# Patient Record
Sex: MALE | Race: ASIAN | NOT HISPANIC OR LATINO | Employment: FULL TIME | URBAN - METROPOLITAN AREA
[De-identification: names, ages, dates, MRNs, and addresses within clinical notes are randomized per-mention and may not be internally consistent; named-entity substitution may affect disease eponyms.]

---

## 2017-03-15 PROBLEM — R10.9 ABDOMINAL PAIN: Status: ACTIVE | Noted: 2017-03-15

## 2017-04-25 ENCOUNTER — HOSPITAL ENCOUNTER (EMERGENCY)
Facility: HOSPITAL | Age: 62
Discharge: HOME | End: 2017-04-25
Attending: EMERGENCY MEDICINE
Payer: MEDICAID

## 2017-04-25 VITALS
HEIGHT: 67 IN | HEART RATE: 98 BPM | OXYGEN SATURATION: 97 % | DIASTOLIC BLOOD PRESSURE: 82 MMHG | RESPIRATION RATE: 18 BRPM | BODY MASS INDEX: 31.39 KG/M2 | TEMPERATURE: 98 F | SYSTOLIC BLOOD PRESSURE: 131 MMHG | WEIGHT: 200 LBS

## 2017-04-25 DIAGNOSIS — G89.29 CHRONIC RIGHT-SIDED THORACIC BACK PAIN: Primary | ICD-10-CM

## 2017-04-25 DIAGNOSIS — M54.6 CHRONIC RIGHT-SIDED THORACIC BACK PAIN: Primary | ICD-10-CM

## 2017-04-25 PROCEDURE — 96372 THER/PROPH/DIAG INJ SC/IM: CPT

## 2017-04-25 PROCEDURE — 99284 EMERGENCY DEPT VISIT MOD MDM: CPT | Mod: 25

## 2017-04-25 PROCEDURE — 63600175 PHARM REV CODE 636 W HCPCS: Performed by: EMERGENCY MEDICINE

## 2017-04-25 RX ORDER — ORPHENADRINE CITRATE 30 MG/ML
30 INJECTION INTRAMUSCULAR; INTRAVENOUS
Status: COMPLETED | OUTPATIENT
Start: 2017-04-25 | End: 2017-04-25

## 2017-04-25 RX ORDER — KETOROLAC TROMETHAMINE 30 MG/ML
15 INJECTION, SOLUTION INTRAMUSCULAR; INTRAVENOUS
Status: COMPLETED | OUTPATIENT
Start: 2017-04-25 | End: 2017-04-25

## 2017-04-25 RX ORDER — CYCLOBENZAPRINE HCL 10 MG
10 TABLET ORAL 3 TIMES DAILY PRN
Qty: 21 TABLET | Refills: 0 | Status: SHIPPED | OUTPATIENT
Start: 2017-04-25 | End: 2017-05-05

## 2017-04-25 RX ORDER — IBUPROFEN 600 MG/1
600 TABLET ORAL EVERY 6 HOURS PRN
Qty: 20 TABLET | Refills: 0 | Status: SHIPPED | OUTPATIENT
Start: 2017-04-25 | End: 2017-05-29

## 2017-04-25 RX ADMIN — ORPHENADRINE CITRATE 30 MG: 30 INJECTION INTRAMUSCULAR; INTRAVENOUS at 08:04

## 2017-04-25 RX ADMIN — KETOROLAC TROMETHAMINE 15 MG: 30 INJECTION, SOLUTION INTRAMUSCULAR at 08:04

## 2017-04-25 NOTE — ED AVS SNAPSHOT
[facility]  [address]  [place] [address]               Carmella Galo   2017  6:41 AM   ED    Description:  Male : 1955   Department:  [facility]           Your Care was Coordinated By:     Provider Role From To    Calvin Marquez MD Attending Provider 17 0709 --      Reason for Visit     Back Pain           Diagnoses this Visit        Comments    Chronic right-sided thoracic back pain    -  Primary       ED Disposition     ED Disposition Condition Comment    Discharge             To Do List           Follow-up Information     Follow up with Praveen Raphael MD .    Specialty:  Neurosurgery    Why:  You have an appointment May 8th at 2pm. Dr. Raphael is a back doctor.     Contact information:    [address]  [address]  [place] [address]  [phone]         These Medications        Disp Refills Start End    cyclobenzaprine (FLEXERIL) 10 MG tablet 21 tablet 0 2017    Take 1 tablet (10 mg total) by mouth 3 (three) times daily as needed for Muscle spasms. - Oral    Pharmacy: [address] Ph #: [phone]       ibuprofen (ADVIL,MOTRIN) 600 MG tablet 20 tablet 0 2017     Take 1 tablet (600 mg total) by mouth every 6 (six) hours as needed for Pain. - Oral    Pharmacy: [address] Ph #: [phone]         [facility]     [facility] -  Assistance  Unless otherwise directed by your provider, please contact [facility], our nurse care line that is available for  assistance.     Registered nurses in the [facility] provide: appointment scheduling, clinical advisement, health education, and other advisory services.  Call: [phone] (toll free)               Medications           Message regarding Medications     Verify the changes and/or additions to  your medication regime listed below are the same as discussed with your clinician today.  If any of these changes or additions are incorrect, please notify your healthcare provider.        START taking these NEW medications        Refills    cyclobenzaprine (FLEXERIL) 10 MG tablet 0    Sig: Take 1 tablet (10 mg total) by mouth 3 (three) times daily as needed for Muscle spasms.    Class: Print    Route: Oral    ibuprofen (ADVIL,MOTRIN) 600 MG tablet 0    Sig: Take 1 tablet (600 mg total) by mouth every 6 (six) hours as needed for Pain.    Class: Print    Route: Oral      These medications were administered today        Dose Freq    ketorolac injection 15 mg 15 mg ED 1 Time    Sig: Inject 15 mg into the muscle ED 1 Time.    Class: Normal    Route: Intramuscular    orphenadrine injection 30 mg 30 mg ED 1 Time    Sig: Inject 1 mL (30 mg total) into the muscle ED 1 Time.    Class: Normal    Route: Intramuscular           Verify that the below list of medications is an accurate representation of the medications you are currently taking.  If none reported, the list may be blank. If incorrect, please contact your healthcare provider. Carry this list with you in case of emergency.           Current Medications     atorvastatin (LIPITOR) 20 MG tablet Take 20 mg by mouth nightly.    clarithromycin (BIAXIN) 500 MG tablet Take 500 mg by mouth 2 (two) times daily.    cyclobenzaprine (FLEXERIL) 10 MG tablet Take 1 tablet (10 mg total) by mouth 3 (three) times daily as needed for Muscle spasms.    desonide 0.05% (DESOWEN) 0.05 % Oint Apply topically 2 (two) times daily. Apply to face    dicyclomine (BENTYL) 20 mg tablet Take 20 mg by mouth every 6 (six) hours as needed.    hydrochlorothiazide (HYDRODIURIL) 25 MG tablet Take 25 mg by mouth once daily.    hydrocodone-acetaminophen 10-325mg (NORCO)  mg Tab Take 1 tablet by mouth every 6 (six) hours as needed (Breakthrough pain of bilateral flanks).    hydrocodone-acetaminophen  "5-325mg (NORCO) 5-325 mg per tablet Take 1 tablet by mouth every 4 (four) hours as needed for Pain.    ibuprofen (ADVIL,MOTRIN) 600 MG tablet Take 1 tablet (600 mg total) by mouth every 6 (six) hours as needed for Pain.    ibuprofen (ADVIL,MOTRIN) 600 MG tablet Take 1 tablet (600 mg total) by mouth every 6 (six) hours as needed for Pain.    ketoconazole (NIZORAL) 2 % cream Apply topically 2 (two) times daily. To face TWICE DAILY x 2 weeks, then prn need    ketoconazole (NIZORAL) 2 % shampoo Apply topically once daily. Wash from neck down, leave on five minutes before washing off    lisinopril-hydrochlorothiazide (PRINZIDE,ZESTORETIC) 10-12.5 mg per tablet Take 1 tablet by mouth once daily.    meloxicam (MOBIC) 15 MG tablet Take one tablet by mouth once a day Take with food for pain do not take with ibuprofen or alleve, may take tylenol    metformin (GLUCOPHAGE) 500 MG tablet Take 500 mg by mouth once daily.    omeprazole (PRILOSEC) 20 MG capsule Take 20 mg by mouth once daily.    OTEZLA 30 mg Tab Take 1 tablet by mouth 2 (two) times daily.    pantoprazole (PROTONIX) 40 MG tablet Take 1 tablet (40 mg total) by mouth once daily.    tizanidine (ZANAFLEX) 4 MG tablet Take 4 mg by mouth every 8 (eight) hours.           Clinical Reference Information           Your Vitals Were     BP Pulse Temp Resp Height Weight    155/89 (BP Location: Right arm, Patient Position: Sitting) 87 98 °F (36.7 °C) (Oral) 20 5' 7" (1.702 m) 90.7 kg (200 lb)    SpO2 BMI             97% 31.32 kg/m2         Allergies as of 4/25/2017     No Known Allergies      Immunizations Administered on Date of Encounter - 4/25/2017     None      ED Micro, Lab, POCT     None      ED Imaging Orders     None        Discharge Instructions           Back Pain (Acute or Chronic)    Back pain is one of the most common problems. The good news is that most people feel better in 1 to 2 weeks, and most of the rest in 1 to 2 months. Most people can remain active.  People " experience and describe pain differently; not everyone is the same.  · The pain can be sharp, stabbing, shooting, aching, cramping or burning.  · Movement, standing, bending, lifting, sitting, or walking may worsen pain.  · It can be localized to one spot or area, or it can be more generalized.  · It can spread or radiate upwards, to the front, or go down your arms or legs (sciatica).  · It can cause muscle spasm.  Most of the time, mechanical problems with the muscles or spine cause the pain. Mechanical problems are usually caused by an injury to the muscles or ligaments. While illness can cause back pain, it is usually not caused by a serious illness. Mechanical problems include:   · Physical activity such as sports, exercise, work, or normal activity  · Overexertion, lifting, pushing, pulling incorrectly or too aggressively  · Sudden twisting, bending, or stretching from an accident, or accidental movement  · Poor posture  · Stretching or moving wrong, without noticing pain at the time  · Poor coordination, lack of regular exercise (check with your doctor about this)  · Spinal disc disease or arthritis  · Stress  Pain can also be related to pregnancy, or illness like appendicitis, bladder or kidney infections, pelvic infections, and many other things.  Acute back pain usually gets better in 1 to 2 weeks. Back pain related to disk disease, arthritis in the spinal joints or spinal stenosis (narrowing of the spinal canal) can become chronic and last for months or years.  Unless you had a physical injury (for example, a car accident or fall) X-rays are usually not needed for the initial evaluation of back pain. If pain continues and does not respond to medical treatment, X-rays and other tests may be needed.  Home care  Try these home care recommendations:  · When in bed, try to find a position of comfort. A firm mattress is best. Try lying flat on your back with pillows under your knees. You can also try lying on  your side with your knees bent up towards your chest and a pillow between your knees.  · At first, do not try to stretch out the sore spots. If there is a strain, it is not like the good soreness you get after exercising without an injury. In this case, stretching may make it worse.  · Avoid prolong sitting, long car rides, or travel. This puts more stress on the lower back than standing or walking.  · During the first 24 to 72 hours after an acute injury or flare up of chronic back pain, apply an ice pack to the painful area for 20 minutes and then remove it for 20 minutes. Do this over a period of 60 to 90 minutes or several times a day. This will reduce swelling and pain. Wrap the ice pack in a thin towel or plastic to protect your skin.  · You can start with ice, then switch to heat. Heat (hot shower, hot bath, or heating pad) reduces pain and works well for muscle spasms. Heat can be applied to the painful area for 20 minutes then remove it for 20 minutes. Do this over a period of 60 to 90 minutes or several times a day. Do not sleep on a heating pad. It can lead to skin burns or tissue damage.  · You can alternate ice and heat therapy. Talk with your doctor about the best treatment for your back pain.  · Therapeutic massage can help relax the back muscles without stretching them.  · Be aware of safe lifting methods and do not lift anything without stretching first.  Medicines  Talk to your doctor before using medicine, especially if you have other medical problems or are taking other medicines.  · You may use over-the-counter medicine as directed on the bottle to control pain, unless another pain medicine was prescribed. If you have chronic conditions like diabetes, liver or kidney disease, stomach ulcers, or gastrointestinal bleeding, or are taking blood thinners, talk to your doctor before taking any medicine.  · Be careful if you are given a prescription medicines, narcotics, or medicine for muscle spasms.  They can cause drowsiness, affect your coordination, reflexes, and judgement. Do not drive or operate heavy machinery.  Follow-up care  Follow up with your healthcare provider, or as advised.   A radiologist will review any X-rays that were taken. Your provide will notify you of any new findings that may affect your care.  Call 911  Call emergency services if any of the following occur:  · Trouble breathing  · Confusion  · Very drowsy or trouble awakening  · Fainting or loss of consciousness  · Rapid or very slow heart rate  · Loss of bowel or bladder control  When to seek medical advice  Call your healthcare provider right away if any of these occur:   · Pain becomes worse or spreads to your legs  · Weakness or numbness in one or both legs  · Numbness in the groin or genital area  Date Last Reviewed: 7/1/2016  © 6363-1712 [employer]. [address] [place] [address]. All rights reserved. This information is not intended as a substitute for professional medical care. Always follow your healthcare professional's instructions.          Discharge References/Attachments     BACK PAIN (ACUTE OR CHRONIC) (ENGLISH)    SPINE AND DISK PROBLEMS, COMMON (ENGLISH)    BACK PAIN (ACUTE OR CHRONIC) (Syriac)      Your Scheduled Appointments     May 08, 2017  2:00 PM CDT   New Patient with Praveen Raphael MD   [place] - Neurosurgery ([facility])    [address] [place] [address], [address]  [facility] [address]   [phone]            Jul 20, 2017  9:40 AM CDT   Established Patient Visit with MD DAYNA Champagne - Dermatology ([facility])    [address] [place] [id number] [phone]              [facility] Sign-Up     Activating your [facility] account is as easy as 1-2-3!     1) Visit [email or web address], select Sign Up Now, enter this activation code and your date of birth, then select Next.  [email or web address]  Expires: 4/29/2017  2:28 PM      2) Create a username and  password to use when you visit [facility] in the future and select a security question in case you lose your password and select Next.    3) Enter your e-mail address and click Sign Up!    Additional Information  If you have questions, please e-mail [email or web address] or call [phone] to talk to our [facility] staff. Remember, [facility] is NOT to be used for urgent needs. For medical emergencies, dial 911.         Smoking Cessation     If you would like to quit smoking:   You may be eligible for free services if you are a [place] resident and started smoking cigarettes before September 1, 1988.  Call the Smoking Cessation Trust ([facility]) toll free at (792) 601-4260 or (654) 153-7593.   Call 2-800-QUIT-NOW if you do not meet the above criteria.   Contact us via email: [email or web address]   View our website for more information: [email or web address]         [facility] complies with applicable Federal civil rights laws and does not discriminate on the basis of race, color, national origin, age, disability, or sex.        Language Assistance Services     ATTENTION: Language assistance services are available, free of charge. Please call [phone].      ATENCIÓN: Si michelle herbert, tiene a katz disposición servicios gratuitos de asistencia lingüística. Llame al [phone].     CHÚ Ý: N?u b?n nói Ti?ng Vi?t, có các d?ch v? h? tr? ngôn ng? mi?n phí dành cho b?n. G?i s? [phone].

## 2017-04-25 NOTE — DISCHARGE INSTRUCTIONS
Back Pain (Acute or Chronic)    Back pain is one of the most common problems. The good news is that most people feel better in 1 to 2 weeks, and most of the rest in 1 to 2 months. Most people can remain active.  People experience and describe pain differently; not everyone is the same.  · The pain can be sharp, stabbing, shooting, aching, cramping or burning.  · Movement, standing, bending, lifting, sitting, or walking may worsen pain.  · It can be localized to one spot or area, or it can be more generalized.  · It can spread or radiate upwards, to the front, or go down your arms or legs (sciatica).  · It can cause muscle spasm.  Most of the time, mechanical problems with the muscles or spine cause the pain. Mechanical problems are usually caused by an injury to the muscles or ligaments. While illness can cause back pain, it is usually not caused by a serious illness. Mechanical problems include:   · Physical activity such as sports, exercise, work, or normal activity  · Overexertion, lifting, pushing, pulling incorrectly or too aggressively  · Sudden twisting, bending, or stretching from an accident, or accidental movement  · Poor posture  · Stretching or moving wrong, without noticing pain at the time  · Poor coordination, lack of regular exercise (check with your doctor about this)  · Spinal disc disease or arthritis  · Stress  Pain can also be related to pregnancy, or illness like appendicitis, bladder or kidney infections, pelvic infections, and many other things.  Acute back pain usually gets better in 1 to 2 weeks. Back pain related to disk disease, arthritis in the spinal joints or spinal stenosis (narrowing of the spinal canal) can become chronic and last for months or years.  Unless you had a physical injury (for example, a car accident or fall) X-rays are usually not needed for the initial evaluation of back pain. If pain continues and does not respond to medical treatment, X-rays and other tests may  be needed.  Home care  Try these home care recommendations:  · When in bed, try to find a position of comfort. A firm mattress is best. Try lying flat on your back with pillows under your knees. You can also try lying on your side with your knees bent up towards your chest and a pillow between your knees.  · At first, do not try to stretch out the sore spots. If there is a strain, it is not like the good soreness you get after exercising without an injury. In this case, stretching may make it worse.  · Avoid prolong sitting, long car rides, or travel. This puts more stress on the lower back than standing or walking.  · During the first 24 to 72 hours after an acute injury or flare up of chronic back pain, apply an ice pack to the painful area for 20 minutes and then remove it for 20 minutes. Do this over a period of 60 to 90 minutes or several times a day. This will reduce swelling and pain. Wrap the ice pack in a thin towel or plastic to protect your skin.  · You can start with ice, then switch to heat. Heat (hot shower, hot bath, or heating pad) reduces pain and works well for muscle spasms. Heat can be applied to the painful area for 20 minutes then remove it for 20 minutes. Do this over a period of 60 to 90 minutes or several times a day. Do not sleep on a heating pad. It can lead to skin burns or tissue damage.  · You can alternate ice and heat therapy. Talk with your doctor about the best treatment for your back pain.  · Therapeutic massage can help relax the back muscles without stretching them.  · Be aware of safe lifting methods and do not lift anything without stretching first.  Medicines  Talk to your doctor before using medicine, especially if you have other medical problems or are taking other medicines.  · You may use over-the-counter medicine as directed on the bottle to control pain, unless another pain medicine was prescribed. If you have chronic conditions like diabetes, liver or kidney disease,  stomach ulcers, or gastrointestinal bleeding, or are taking blood thinners, talk to your doctor before taking any medicine.  · Be careful if you are given a prescription medicines, narcotics, or medicine for muscle spasms. They can cause drowsiness, affect your coordination, reflexes, and judgement. Do not drive or operate heavy machinery.  Follow-up care  Follow up with your healthcare provider, or as advised.   A radiologist will review any X-rays that were taken. Your provide will notify you of any new findings that may affect your care.  Call 911  Call emergency services if any of the following occur:  · Trouble breathing  · Confusion  · Very drowsy or trouble awakening  · Fainting or loss of consciousness  · Rapid or very slow heart rate  · Loss of bowel or bladder control  When to seek medical advice  Call your healthcare provider right away if any of these occur:   · Pain becomes worse or spreads to your legs  · Weakness or numbness in one or both legs  · Numbness in the groin or genital area  Date Last Reviewed: 7/1/2016  © 0638-9168 [employer]. [address] [place] [address], [place] [address]. All rights reserved. This information is not intended as a substitute for professional medical care. Always follow your healthcare professional's instructions.

## 2017-04-25 NOTE — ED NOTES
Pt c/o pain to the middle of his back for the past 5 months, worse in the past few days. States he has been seen for this pain before, but continues to have pain. States pain makes it difficult for him to sleep at night.

## 2017-04-25 NOTE — ED PROVIDER NOTES
Encounter Date: 4/25/2017       History     Chief Complaint   Patient presents with    Back Pain     Patient presents to the ED with complaints of having back pain that has been presents x 5 months but states now being worse over the past x 2 days.      Review of patient's allergies indicates:  No Known Allergies  HPI Comments: Patient is a 61-year-old male with a past medical history of diabetes, hypertension, chronic pain, cholecystectomy within the last year presents to emergency room for evaluation of right-sided mid to lower back pain that radiates down to the right lower back.  Pain has been present for the past 5 months.  The patient as been seen by multiple ER providers for the pain and has been told that is likely musculoskeletal in nature.  He has had 2 CAT scans both of them not showing anything acute.  There are no signs of renal colic.  Patient states he has pain it's in the right back and it hurts worse when he lays down or rolls over.  He denies any chest pain shortness breath abdominal pain nausea vomiting or diarrhea.  He states his pain began after his cholecystectomy.  He's had lab work at prior visits for this.  He hasn't followed up with a back doctor, primary care, or his surgeon for his symptoms.    Past Medical History:   Diagnosis Date    Allergy     Chronic pain     Diabetes mellitus     Hypertension      Past Surgical History:   Procedure Laterality Date    CHOLECYSTECTOMY       History reviewed. No pertinent family history.  Social History   Substance Use Topics    Smoking status: Smoker, Current Status Unknown     Packs/day: 0.50     Types: Cigarettes     Last attempt to quit: 12/2016    Smokeless tobacco: None    Alcohol use No     Review of Systems   Constitutional: Negative for fever.   HENT: Negative for ear pain, rhinorrhea and sore throat.    Eyes: Negative for pain and visual disturbance.   Respiratory: Negative for apnea, cough and shortness of breath.    Cardiovascular:  Negative for chest pain.   Gastrointestinal: Negative for abdominal pain, diarrhea, nausea and vomiting.   Genitourinary: Negative for difficulty urinating, dysuria, flank pain, frequency, hematuria and urgency.   Musculoskeletal: Positive for back pain. Negative for arthralgias, myalgias, neck pain and neck stiffness.   Skin: Negative for rash.   Neurological: Negative for syncope, weakness, numbness and headaches.   All other systems reviewed and are negative.      Physical Exam   Initial Vitals   BP Pulse Resp Temp SpO2   04/25/17 0640 04/25/17 0640 04/25/17 0640 04/25/17 0640 04/25/17 0640   155/89 87 20 98 °F (36.7 °C) 97 %     Physical Exam    Nursing note and vitals reviewed.  Constitutional: He appears well-developed.   HENT:   Head: Normocephalic and atraumatic.   Mouth/Throat: Oropharynx is clear and moist.   Eyes: Conjunctivae and EOM are normal. Pupils are equal, round, and reactive to light.   Neck: Normal range of motion. Neck supple.   Cardiovascular: Normal rate, regular rhythm, normal heart sounds and intact distal pulses. Exam reveals no gallop and no friction rub.    No murmur heard.  Pulmonary/Chest: Breath sounds normal. He has no wheezes. He has no rhonchi. He has no rales.   Abdominal: Soft. Bowel sounds are normal. There is no tenderness. There is no rebound and no guarding.   Musculoskeletal: Normal range of motion. He exhibits tenderness (over the right sided lower thoracic upper lumbar region.  With reproducible muscle tenderness.  No muscle spasms.).   Neurological: He is alert and oriented to person, place, and time. He has normal strength. No sensory deficit.   Skin: Skin is warm and dry.   Psychiatric: He has a normal mood and affect.         ED Course   Procedures  Labs Reviewed - No data to display          Medical Decision Making:   The patient's pain is muscular skeletal nature and may have a pinched nerve with a mild radiculopathy.  The patient needs to see primary care or spine  surgeon.  He has no neurologic deficits, fevers, chest pain, abdominal pain.  I don't believe this is acute coronary syndrome, biliary obstruction, renal colic, pulmonary embolism, pneumonia, pneumothorax, infectious process such as epidural abscess.  The patient is likely stable for discharge with anti-inflammatories and muscle relaxers.  He was given a shot of Toradol and Norflex here.  We are attempting to schedule an appointment with Dr. Raphael.  Patient does have a language issue and history with recommendations were made using the kidneys .  This is very likely playing a role with him questioning is diagnosis and treatment plan.                   ED Course     Clinical Impression:   The encounter diagnosis was Chronic right-sided thoracic back pain.          Calvin Marquez MD  04/25/17 0857

## 2017-05-08 ENCOUNTER — OFFICE VISIT (OUTPATIENT)
Dept: NEUROSURGERY | Facility: CLINIC | Age: 62
End: 2017-05-08
Payer: MEDICAID

## 2017-05-08 VITALS
WEIGHT: 198.38 LBS | BODY MASS INDEX: 31.07 KG/M2 | HEART RATE: 115 BPM | SYSTOLIC BLOOD PRESSURE: 127 MMHG | DIASTOLIC BLOOD PRESSURE: 83 MMHG

## 2017-05-08 DIAGNOSIS — M54.6 CHRONIC RIGHT-SIDED THORACIC BACK PAIN: ICD-10-CM

## 2017-05-08 DIAGNOSIS — G89.29 CHRONIC RIGHT-SIDED THORACIC BACK PAIN: ICD-10-CM

## 2017-05-08 DIAGNOSIS — M54.32 LEFT SIDED SCIATICA: Primary | ICD-10-CM

## 2017-05-08 PROCEDURE — 99999 PR PBB SHADOW E&M-EST. PATIENT-LVL III: CPT | Mod: PBBFAC,,, | Performed by: PHYSICIAN ASSISTANT

## 2017-05-08 PROCEDURE — 99203 OFFICE O/P NEW LOW 30 MIN: CPT | Mod: S$PBB,,, | Performed by: PHYSICIAN ASSISTANT

## 2017-05-08 PROCEDURE — 99213 OFFICE O/P EST LOW 20 MIN: CPT | Mod: PBBFAC,PO | Performed by: PHYSICIAN ASSISTANT

## 2017-05-08 NOTE — PROGRESS NOTES
[facility]  Clinic Consult     Consult Requested By: ED physician     Reason for Consult: back pain       SUBJECTIVE:     Chief Complaint: right sided upper back pain, left leg pain     History of Present Illness:  Carmella Galo is a 61 y.o. male who presents with complaints of right sided upper back pain and left leg pain. This pain is chronic and the patient has been seen in the ED multiple times for these complaints. He was last seen on 4/25/17. There is a language barrier present, so history is limited. Patient will need an  for future visits.     Patient complains of right sided upper back pain. This pain is constant. It will occasionally radiating up and down his back. The patient also complains of left leg pain. This is constant and is in the posterior aspect of his leg. He denies low back pain. Reports occasional numbness in foot. The pain is worse with movement. He is unable to walk due to the pain. The pain keeps him up at night. Denies injury or trauma. Denies symptoms of bowel/bladder dysfunction.       Review of patient's allergies indicates:  No Known Allergies    Past Medical History:   Diagnosis Date    Allergy     Chronic pain     Diabetes mellitus     Hypertension      Past Surgical History:   Procedure Laterality Date    CHOLECYSTECTOMY       No family history on file.  Social History   Substance Use Topics    Smoking status: Smoker, Current Status Unknown     Packs/day: 0.50     Types: Cigarettes     Last attempt to quit: 12/2016    Smokeless tobacco: None    Alcohol use No        Review of Systems:  Constitutional: no fever, chills or night sweats. No changes in weight   Eyes: no visual changes   ENT: no nasal congestion or sore throat   Respiratory: no cough or shortness of breath   Cardiovascular: no chest pain or palpitations   Gastrointestinal: no nausea or vomiting   Genitourinary: no hematuria or dysuria   Integument/Breast: no rash or pruritis    Musculoskeletal: right upper back pain, left leg pain   Neurological: no seizures or tremors         OBJECTIVE:     Vital Signs (Most Recent):  Pulse: (!) 115 (05/08/17 1354)  BP: 127/83 (05/08/17 1354)    Physical Exam:  General: well developed, well nourished, no distress.   Neurologic: Alert and oriented. Thought content appropriate.  GCS: Motor: 6/Verbal: 5/Eyes: 4 GCS Total: 15  Head: normocephalic, atraumatic  Eyes: EOMI.  Neck: trachea midline, no JVD   Cardiovascular: no LE edema  Pulmonary: normal respirations, no signs of respiratory distress  Abdomen: soft, non-distended  Sensory: intact to light touch throughout  Skin: Skin is warm, dry and intact.  Motor Strength: Moves all extremities spontaneously with good tone.  No abnormal movements seen.     Strength  Deltoids Triceps Biceps Wrist Extension Wrist Flexion Hand  Interossei   Upper: R 5/5 5/5 5/5 5/5 5/5 5/5 5/5    L 5/5 5/5 5/5 5/5 5/5 5/5 5/5     Iliopsoas Quadriceps Knee  Flexion Tibialis  anterior Gastro- cnemius EHL    Lower: R 5/5 5/5 5/5 5/5 5/5 5/5     L 5/5 5/5 5/5 5/5 5/5 5/5      DTR's: 2 + and symmetric in UE and LE  Clonus: absent    Gait: normal          Able to walk on heels & toes  Back: full ROM, no pain with ROM, no midline tenderness, point tenderness right paraspinal, mid thoracic region   Straight leg raise: negative bilaterally   SI Joint tenderness: Negative bilaterally       Diagnostic Results:  No dedicated spine imaging   I have independently reviewed CT Abd/pelvis: reveals stenosis at L4-5     ASSESSMENT/PLAN:     60 yo male with right sided upper back pain likely muscular in nature and posterior left leg pain which could be caused by L4-5 stenosis seen on CT.     -MRI lumbar spine  -Follow up with Dr. Raphael after imaging. Patient will need a  at this visit.     Discussed with Dr. Donavon Badillo PA-C  Neurosurgery

## 2017-05-12 ENCOUNTER — HOSPITAL ENCOUNTER (OUTPATIENT)
Dept: RADIOLOGY | Facility: HOSPITAL | Age: 62
Discharge: HOME | End: 2017-05-12
Attending: NEUROLOGICAL SURGERY
Payer: MEDICAID

## 2017-05-12 DIAGNOSIS — M54.32 LEFT SIDED SCIATICA: ICD-10-CM

## 2017-05-12 PROCEDURE — 72148 MRI LUMBAR SPINE W/O DYE: CPT | Mod: TC,PO

## 2017-05-29 ENCOUNTER — OFFICE VISIT (OUTPATIENT)
Dept: NEUROSURGERY | Facility: CLINIC | Age: 62
End: 2017-05-29
Payer: MEDICAID

## 2017-05-29 DIAGNOSIS — M48.061 SPINAL STENOSIS OF LUMBAR REGION WITH RADICULOPATHY: ICD-10-CM

## 2017-05-29 DIAGNOSIS — G89.29 CHRONIC MIDLINE THORACIC BACK PAIN: Primary | ICD-10-CM

## 2017-05-29 DIAGNOSIS — M54.16 SPINAL STENOSIS OF LUMBAR REGION WITH RADICULOPATHY: ICD-10-CM

## 2017-05-29 DIAGNOSIS — M54.6 CHRONIC MIDLINE THORACIC BACK PAIN: Primary | ICD-10-CM

## 2017-05-29 PROCEDURE — 99213 OFFICE O/P EST LOW 20 MIN: CPT | Mod: PBBFAC,PO | Performed by: NEUROLOGICAL SURGERY

## 2017-05-29 PROCEDURE — 99999 PR PBB SHADOW E&M-EST. PATIENT-LVL III: CPT | Mod: PBBFAC,,, | Performed by: NEUROLOGICAL SURGERY

## 2017-05-29 PROCEDURE — 99214 OFFICE O/P EST MOD 30 MIN: CPT | Mod: S$PBB,,, | Performed by: NEUROLOGICAL SURGERY

## 2017-05-29 RX ORDER — CELECOXIB 200 MG/1
200 CAPSULE ORAL 2 TIMES DAILY
Qty: 28 CAPSULE | Refills: 1 | Status: SHIPPED | OUTPATIENT
Start: 2017-05-29 | End: 2017-08-07 | Stop reason: SDUPTHER

## 2017-05-29 NOTE — PROGRESS NOTES
NEUROSURGICAL OUTPATIENT CONSULTATION NOTE    DATE OF SERVICE:  05/29/2017    ATTENDING PHYSICIAN:  Praveen Raphael MD    CONSULT REQUESTED BY:  Self referred    REASON FOR CONSULT:  Thoracic back pain, left leg pain    SUBJECTIVE:    HISTORY OF PRESENT ILLNESS:  The interview was done with a  because the patient is speaking Lao. This is a very pleasant 61 y.o. male who is complaining of worsening thoracic back pain and left leg pain for many years. The thoracic back pain is constant and is severe. The patient has been out of work because of the back pain . He has more back pain than leg pain. The leg pain is triggered by walking and irradiate in the L5 and S1 distribution on the left side. He cannot walk long distances because of the back and leg pain. He tried injection in the past and PT with some pain relief. In the last 5 months his pain has become worse. He would like to have pain relief in order to get back to work. Denies having weakness or sphincter dysfunction.     Low Back Pain Scale  R Low Back-Pain Score: 7  R Low Back-Pain Intensity: Pain killers give complete relief from pain  R Low Back-Pain Score: It is painful to look after myself and I am slow and careful  Low Back-Lifting: Pain prevents me from lifting heavy weights off the floor, but I can manage if they are conveniently positioned for example on a table   Low Back-Walking: Pain prevents me walking more than .5 mile   Low Back-Sitting: Pain prevents me from sitting more than 1 hour   Low Back-Standing: I cannot stand for longer than 1 hour without increasing pain   Low Back-Sleeping: Because of pain my normal nights sleep is reduced by less than one quarter   Low Back-Social Life: Pain has no significant effect on my social life apart from limiting my more en   Low Back-Traveling: I have extra pain while traveling but it does not compel me to seek alternate forms of travel   Low Back-Changing Degree of Pain: My pain is gradually  worsening         PAST MEDICAL HISTORY:  Active Ambulatory Problems     Diagnosis Date Noted    Abdominal pain 03/15/2017     Resolved Ambulatory Problems     Diagnosis Date Noted    No Resolved Ambulatory Problems     Past Medical History:   Diagnosis Date    Allergy     Chronic pain     Diabetes mellitus     Hypertension        PAST SURGICAL HISTORY:  Past Surgical History:   Procedure Laterality Date    CHOLECYSTECTOMY         SOCIAL HISTORY:   Social History     Social History    Marital status:      Spouse name: N/A    Number of children: N/A    Years of education: 12     Occupational History    Not on file.     Social History Main Topics    Smoking status: Smoker, Current Status Unknown     Packs/day: 0.50     Types: Cigarettes     Last attempt to quit: 12/2016    Smokeless tobacco: Not on file    Alcohol use No    Drug use: No    Sexual activity: No     Other Topics Concern    Not on file     Social History Narrative    No narrative on file       FAMILY HISTORY:  No family history on file.    CURRENTS MEDICATIONS:  Current Outpatient Prescriptions on File Prior to Visit   Medication Sig Dispense Refill    atorvastatin (LIPITOR) 20 MG tablet Take 20 mg by mouth nightly.  6    desonide 0.05% (DESOWEN) 0.05 % Oint Apply topically 2 (two) times daily. Apply to face 60 g 2    hydrochlorothiazide (HYDRODIURIL) 25 MG tablet Take 25 mg by mouth once daily.  1    ketoconazole (NIZORAL) 2 % shampoo Apply topically once daily. Wash from neck down, leave on five minutes before washing off 120 mL 2    metformin (GLUCOPHAGE) 500 MG tablet Take 500 mg by mouth once daily.  6    omeprazole (PRILOSEC) 20 MG capsule Take 20 mg by mouth once daily.  4    pantoprazole (PROTONIX) 40 MG tablet Take 1 tablet (40 mg total) by mouth once daily. 90 tablet 3    [DISCONTINUED] OTEZLA 30 mg Tab Take 1 tablet by mouth 2 (two) times daily.  10    clarithromycin (BIAXIN) 500 MG tablet Take 500 mg by mouth 2  (two) times daily.  0    dicyclomine (BENTYL) 20 mg tablet Take 20 mg by mouth every 6 (six) hours as needed.  0    hydrocodone-acetaminophen 5-325mg (NORCO) 5-325 mg per tablet Take 1 tablet by mouth every 4 (four) hours as needed for Pain. 18 tablet 0    lisinopril-hydrochlorothiazide (PRINZIDE,ZESTORETIC) 10-12.5 mg per tablet Take 1 tablet by mouth once daily.  6    [DISCONTINUED] hydrocodone-acetaminophen 10-325mg (NORCO)  mg Tab Take 1 tablet by mouth every 6 (six) hours as needed (Breakthrough pain of bilateral flanks). 12 tablet 0    [DISCONTINUED] ibuprofen (ADVIL,MOTRIN) 600 MG tablet Take 1 tablet (600 mg total) by mouth every 6 (six) hours as needed for Pain. 20 tablet 0    [DISCONTINUED] ibuprofen (ADVIL,MOTRIN) 600 MG tablet Take 1 tablet (600 mg total) by mouth every 6 (six) hours as needed for Pain. 20 tablet 0    [DISCONTINUED] ketoconazole (NIZORAL) 2 % cream Apply topically 2 (two) times daily. To face TWICE DAILY x 2 weeks, then prn need 60 g 2    [DISCONTINUED] meloxicam (MOBIC) 15 MG tablet Take one tablet by mouth once a day Take with food for pain do not take with ibuprofen or alleve, may take tylenol  5    [DISCONTINUED] tizanidine (ZANAFLEX) 4 MG tablet Take 4 mg by mouth every 8 (eight) hours.       No current facility-administered medications on file prior to visit.        ALLERGIES:  Review of patient's allergies indicates:  No Known Allergies    REVIEW OF SYSTEMS:  Review of Systems   Constitutional: Negative for diaphoresis, fever and weight loss.   Respiratory: Negative for shortness of breath.    Cardiovascular: Negative for chest pain.   Gastrointestinal: Negative for blood in stool.   Genitourinary: Negative for hematuria.   Endo/Heme/Allergies: Does not bruise/bleed easily.   All other systems reviewed and are negative.      OBJECTIVE:    PHYSICAL EXAMINATION:         Physical Exam:  Vitals reviewed.    Constitutional: He appears well-developed and well-nourished.      Eyes: Pupils are equal, round, and reactive to light. Conjunctivae and EOM are normal.     Cardiovascular: Normal distal pulses and no edema.     Abdominal: Soft.     Skin: Skin displays no rash on trunk and no rash on extremities. Skin displays no lesions on trunk and no lesions on extremities.     Psych/Behavior: He is alert. He is oriented to person, place, and time. He has a normal mood and affect.     Musculoskeletal:        Neck: Range of motion is full.     Neurological:        DTRs: Tricep reflexes are 2+ on the right side and 2+ on the left side. Bicep reflexes are 2+ on the right side and 2+ on the left side. Brachioradialis reflexes are 2+ on the right side and 2+ on the left side. Patellar reflexes are 2+ on the right side and 2+ on the left side. Achilles reflexes are 2+ on the right side and 2+ on the left side.       Back Exam     Tenderness   The patient is experiencing tenderness in the thoracic (thoracolumbar para-axial muscle pain).    Range of Motion   Extension: normal   Flexion: normal   Lateral Bend Right: normal   Lateral Bend Left: normal   Rotation Right: normal   Rotation Left: normal     Muscle Strength   Right Quadriceps:  5/5   Left Quadriceps:  5/5   Right Hamstrings:  5/5   Left Hamstrings:  5/5     Tests   Straight leg raise right: negative  Straight leg raise left: negative    Other   Toe Walk: normal  Heel Walk: normal            SI joint:   Palpation at the right and left SI joints not painful  JULIO test is negative bilaterally  Gaenslen test is negative bilaterally  Thigh thrust test is negative bilaterally    Neurologic Exam     Mental Status   Oriented to person, place, and time.   Speech: speech is normal   Level of consciousness: alert    Cranial Nerves   Cranial nerves II through XII intact.     CN III, IV, VI   Pupils are equal, round, and reactive to light.  Extraocular motions are normal.     Motor Exam   Muscle bulk: normal  Overall muscle tone: normal    Strength    Right deltoid: 5/5  Left deltoid: 5/5  Right biceps: 5/5  Left biceps: 5/5  Right triceps: 5/5  Left triceps: 5/5  Right wrist flexion: 5/5  Left wrist flexion: 5/5  Right wrist extension: 5/5  Left wrist extension: 5/5  Right interossei: 5/5  Left interossei: 5/5  Right iliopsoas: 5/5  Left iliopsoas: 5/5  Right quadriceps: 5/5  Left quadriceps: 5/5  Right hamstrin/5  Left hamstrin/5  Right anterior tibial: 5/5  Left anterior tibial: 5/5  Right posterior tibial: 5/5  Left posterior tibial: 5/5  Right peroneal: 5/5  Left peroneal: 5/5  Right gastroc: 5/5  Left gastroc: 5/5    Sensory Exam   Light touch normal.   Pinprick normal.     Gait, Coordination, and Reflexes     Gait  Gait: normal    Coordination   Finger to nose coordination: normal  Tandem walking coordination: normal    Reflexes   Right brachioradialis: 2+  Left brachioradialis: 2+  Right biceps: 2+  Left biceps: 2+  Right triceps: 2+  Left triceps: 2+  Right patellar: 2+  Left patellar: 2+  Right achilles: 2+  Left achilles: 2+  Right plantar: normal  Left plantar: normal  Right Stewart: absent  Left Stewart: absent  Right ankle clonus: absent  Left ankle clonus: absent        DIAGNOSTIC DATA:  I personally reviewed the following imaging:   Lumbar spine MRI 2017: Severe L4-5 stenosis    ASSESMENT:  This is a 61 y.o. male with     Problem List Items Addressed This Visit     None      Visit Diagnoses     Chronic midline thoracic back pain    -  Primary    Relevant Orders    MRI Thoracic Spine Without Contrast    Spinal stenosis of lumbar region with radiculopathy            R/O thoracic disc herniation, mass or fracture.    PLAN:  Thoracic spine MRI  Celebrex 200 mg PO BID  Continue PT at home  Follow-up after the thoracic spine MRI    The patient has increased perioperative risks because of these comorbidities: smoker.         Praveen Raphael MD  Pager: [phone]

## 2017-06-02 ENCOUNTER — HOSPITAL ENCOUNTER (OUTPATIENT)
Dept: RADIOLOGY | Facility: HOSPITAL | Age: 62
Discharge: HOME | End: 2017-06-02
Attending: NEUROLOGICAL SURGERY
Payer: MEDICAID

## 2017-06-02 DIAGNOSIS — M54.6 CHRONIC MIDLINE THORACIC BACK PAIN: ICD-10-CM

## 2017-06-02 DIAGNOSIS — G89.29 CHRONIC MIDLINE THORACIC BACK PAIN: ICD-10-CM

## 2017-06-02 PROCEDURE — 72146 MRI CHEST SPINE W/O DYE: CPT | Mod: 26,,, | Performed by: RADIOLOGY

## 2017-06-02 PROCEDURE — 72146 MRI CHEST SPINE W/O DYE: CPT | Mod: TC

## 2017-06-03 DIAGNOSIS — M47.814 FACET ARTHROPATHY, THORACIC: Primary | ICD-10-CM

## 2017-06-05 ENCOUNTER — TELEPHONE (OUTPATIENT)
Dept: NEUROSURGERY | Facility: CLINIC | Age: 62
End: 2017-06-05

## 2017-07-11 ENCOUNTER — TELEPHONE (OUTPATIENT)
Dept: NEUROSURGERY | Facility: CLINIC | Age: 62
End: 2017-07-11

## 2017-07-11 NOTE — TELEPHONE ENCOUNTER
Called Pt daughter in regards to a follow up appointment for her dad for the MRI scheduled pt to see Dr Raphael 08/07/17

## 2017-08-07 ENCOUNTER — HOSPITAL ENCOUNTER (OUTPATIENT)
Dept: RADIOLOGY | Facility: HOSPITAL | Age: 62
Discharge: HOME | End: 2017-08-07
Attending: NEUROLOGICAL SURGERY
Payer: MEDICAID

## 2017-08-07 ENCOUNTER — OFFICE VISIT (OUTPATIENT)
Dept: NEUROSURGERY | Facility: CLINIC | Age: 62
End: 2017-08-07
Payer: MEDICAID

## 2017-08-07 VITALS
SYSTOLIC BLOOD PRESSURE: 132 MMHG | HEIGHT: 67 IN | DIASTOLIC BLOOD PRESSURE: 86 MMHG | WEIGHT: 205 LBS | BODY MASS INDEX: 32.18 KG/M2 | HEART RATE: 117 BPM

## 2017-08-07 DIAGNOSIS — G95.9 CERVICAL MYELOPATHY: Primary | ICD-10-CM

## 2017-08-07 DIAGNOSIS — G95.9 CERVICAL MYELOPATHY: ICD-10-CM

## 2017-08-07 DIAGNOSIS — M48.062 SPINAL STENOSIS OF LUMBAR REGION WITH NEUROGENIC CLAUDICATION: ICD-10-CM

## 2017-08-07 PROCEDURE — 72100 X-RAY EXAM L-S SPINE 2/3 VWS: CPT | Mod: 26,,, | Performed by: RADIOLOGY

## 2017-08-07 PROCEDURE — 99999 PR PBB SHADOW E&M-EST. PATIENT-LVL III: CPT | Mod: PBBFAC,,, | Performed by: NEUROLOGICAL SURGERY

## 2017-08-07 PROCEDURE — 99213 OFFICE O/P EST LOW 20 MIN: CPT | Mod: S$PBB,,, | Performed by: NEUROLOGICAL SURGERY

## 2017-08-07 PROCEDURE — 72100 X-RAY EXAM L-S SPINE 2/3 VWS: CPT | Mod: TC

## 2017-08-07 PROCEDURE — 72040 X-RAY EXAM NECK SPINE 2-3 VW: CPT | Mod: TC

## 2017-08-07 PROCEDURE — 72040 X-RAY EXAM NECK SPINE 2-3 VW: CPT | Mod: 26,,, | Performed by: RADIOLOGY

## 2017-08-07 PROCEDURE — 3008F BODY MASS INDEX DOCD: CPT | Mod: ,,, | Performed by: NEUROLOGICAL SURGERY

## 2017-08-07 PROCEDURE — 99213 OFFICE O/P EST LOW 20 MIN: CPT | Mod: PBBFAC,PO | Performed by: NEUROLOGICAL SURGERY

## 2017-08-07 RX ORDER — CELECOXIB 200 MG/1
200 CAPSULE ORAL 2 TIMES DAILY
Qty: 28 CAPSULE | Refills: 3 | Status: SHIPPED | OUTPATIENT
Start: 2017-08-07 | End: 2017-08-31

## 2017-08-07 NOTE — PROGRESS NOTES
NEUROSURGICAL PROGRESS NOTE    DATE OF SERVICE:  08/07/2017    ATTENDING PHYSICIAN:  Praveen Raphael MD    SUBJECTIVE:    INTERIM HISTORY:    The interview was done with a  because the patient is speaking Greek. This is a very pleasant 61 y.o. male who is complaining of worsening thoracic back pain and left leg pain for many years. The thoracic back pain is constant and is severe. The patient has been out of work because of the back pain . He has more back pain than leg pain. The leg pain is triggered by walking and irradiate in the L5 and S1 distribution on the left side. He cannot walk long distances because of the back and leg pain. He tried injection in the past and PT with some pain relief. In the last 5 months his pain has become worse. He would like to have pain relief in order to get back to work. Denies having weakness or sphincter dysfunction.     Since his last visit the patient was treated with celebrex and his thoracolumbar back pain has improved. He still has numbness in both legs and difficulty walking. He also complains of bilateral hands numbness at night and during the day. He has gait imbalance. Denies having difficulty buttoning shirts or dropping things.          Low Back Pain Scale  R Low Back-Pain Score: 2  R Low Back-Pain Intensity: The pain is bad, but I manage without taking pain killers  R Low Back-Pain Score: I can look after myself normally but it causes extra pain  Low Back-Lifting: I can lift heavy weights but it gives extra pain   Low Back-Walking: Pain prevents me walking more than 1 mile   Low Back-Sitting: I can sit only in my favorite chair as long as I like   Low Back-Standing: I have some pain on standing but it does not increase with time   Low Back-Sleeping: I have pain in bed but it does not prevent me from sleeping well   Low Back-Social Life: My social life is normal but it increases the degree of pain   Low Back-Traveling: I have some pain when traveling but  shamar of my usual forms of travel make it any worse   Low Back-Changing Degree of Pain: My pain fluctuates but is definitely getting better         PAST MEDICAL HISTORY:  Active Ambulatory Problems     Diagnosis Date Noted    Abdominal pain 03/15/2017     Resolved Ambulatory Problems     Diagnosis Date Noted    No Resolved Ambulatory Problems     Past Medical History:   Diagnosis Date    Allergy     Chronic pain     Diabetes mellitus     Hypertension        PAST SURGICAL HISTORY:  Past Surgical History:   Procedure Laterality Date    CHOLECYSTECTOMY         SOCIAL HISTORY:   Social History     Social History    Marital status:      Spouse name: N/A    Number of children: N/A    Years of education: 12     Occupational History    Not on file.     Social History Main Topics    Smoking status: Smoker, Current Status Unknown     Packs/day: 0.50     Types: Cigarettes     Last attempt to quit: 12/2016    Smokeless tobacco: Not on file    Alcohol use No    Drug use: No    Sexual activity: No     Other Topics Concern    Not on file     Social History Narrative    No narrative on file       FAMILY HISTORY:  No family history on file.    CURRENTS MEDICATIONS:  Current Outpatient Prescriptions on File Prior to Visit   Medication Sig Dispense Refill    atorvastatin (LIPITOR) 20 MG tablet Take 20 mg by mouth nightly.  6    celecoxib (CELEBREX) 200 MG capsule Take 1 capsule (200 mg total) by mouth 2 (two) times daily. 28 capsule 1    desonide 0.05% (DESOWEN) 0.05 % Oint Apply topically 2 (two) times daily. Apply to face 60 g 2    dicyclomine (BENTYL) 20 mg tablet Take 20 mg by mouth every 6 (six) hours as needed.  0    hydrochlorothiazide (HYDRODIURIL) 25 MG tablet Take 25 mg by mouth once daily.  1    ketoconazole (NIZORAL) 2 % shampoo Apply topically once daily. Wash from neck down, leave on five minutes before washing off 120 mL 2    lisinopril-hydrochlorothiazide (PRINZIDE,ZESTORETIC) 10-12.5 mg  per tablet Take 1 tablet by mouth once daily.  6    metformin (GLUCOPHAGE) 500 MG tablet Take 500 mg by mouth once daily.  6    omeprazole (PRILOSEC) 20 MG capsule Take 20 mg by mouth once daily.  4    pantoprazole (PROTONIX) 40 MG tablet Take 1 tablet (40 mg total) by mouth once daily. 90 tablet 3    clarithromycin (BIAXIN) 500 MG tablet Take 500 mg by mouth 2 (two) times daily.  0    hydrocodone-acetaminophen 5-325mg (NORCO) 5-325 mg per tablet Take 1 tablet by mouth every 4 (four) hours as needed for Pain. 18 tablet 0     No current facility-administered medications on file prior to visit.        ALLERGIES:  Review of patient's allergies indicates:  No Known Allergies    REVIEW OF SYSTEMS:  Review of Systems   Constitutional: Negative for diaphoresis, fever and weight loss.   Respiratory: Negative for shortness of breath.    Cardiovascular: Negative for chest pain.   Gastrointestinal: Negative for blood in stool.   Genitourinary: Negative for hematuria.   Endo/Heme/Allergies: Does not bruise/bleed easily.   All other systems reviewed and are negative.        OBJECTIVE:    PHYSICAL EXAMINATION:   Vitals:    08/07/17 0953   BP: 132/86   Pulse: (!) 117       Physical Exam:  Vitals reviewed.    Constitutional: He appears well-developed and well-nourished.     Eyes: Pupils are equal, round, and reactive to light. Conjunctivae and EOM are normal.     Cardiovascular: Normal distal pulses and no edema.     Abdominal: Soft.     Skin: Skin displays no rash on trunk and no rash on extremities. Skin displays no lesions on trunk and no lesions on extremities.     Psych/Behavior: He is alert. He is oriented to person, place, and time. He has a normal mood and affect.     Musculoskeletal:        Neck: Range of motion is full.     Neurological:        DTRs: Tricep reflexes are 2+ on the right side and 2+ on the left side. Bicep reflexes are 2+ on the right side and 2+ on the left side. Brachioradialis reflexes are 2+ on the  right side and 2+ on the left side. Patellar reflexes are 3+ on the right side and 2+ on the left side. Achilles reflexes are 3+ on the right side and 2+ on the left side.       Back Exam     Tenderness   The patient is experiencing no tenderness.     Range of Motion   Extension: normal   Flexion: normal   Lateral Bend Right: normal   Lateral Bend Left: normal   Rotation Right: normal   Rotation Left: normal     Muscle Strength   Right Quadriceps:  5/5   Left Quadriceps:  5/5   Right Hamstrings:  5/5   Left Hamstrings:  5/5     Tests   Straight leg raise right: negative  Straight leg raise left: negative    Other   Toe Walk: normal  Heel Walk: normal              Neurologic Exam     Mental Status   Oriented to person, place, and time.   Speech: speech is normal   Level of consciousness: alert    Cranial Nerves   Cranial nerves II through XII intact.     CN III, IV, VI   Pupils are equal, round, and reactive to light.  Extraocular motions are normal.     Motor Exam   Muscle bulk: normal  Overall muscle tone: normal    Strength   Right deltoid: 5/5  Left deltoid: 5/5  Right biceps: 5/5  Left biceps: 5/5  Right triceps: 5/5  Left triceps: 5/5  Right wrist flexion: 5/5  Left wrist flexion: 5/5  Right wrist extension: 5/5  Left wrist extension: 5/5  Right interossei: 5/5  Left interossei: 4/5  Right iliopsoas: 5/5  Left iliopsoas: 5/5  Right quadriceps: 5/5  Left quadriceps: 5/5  Right hamstrin/5  Left hamstrin/5  Right anterior tibial: 5/5  Left anterior tibial: 5/5  Right posterior tibial: 5/5  Left posterior tibial: 5/5  Right peroneal: 5/5  Left peroneal: 5/5  Right gastroc: 5/5  Left gastroc: 5/5    Sensory Exam   Light touch normal.   Pinprick normal.     Gait, Coordination, and Reflexes     Gait  Gait: normal    Coordination   Finger to nose coordination: normal  Tandem walking coordination: abnormal    Reflexes   Right brachioradialis: 2+  Left brachioradialis: 2+  Right biceps: 2+  Left biceps: 2+  Right  triceps: 2+  Left triceps: 2+  Right patellar: 3+  Left patellar: 2+  Right achilles: 3+  Left achilles: 2+  Right plantar: normal  Left plantar: normal  Right Stewart: present  Left Stewart: present  Right ankle clonus: absent  Left ankle clonus: absent        DIAGNOSTIC DATA:  I personally reviewed the following imaging:   Thoracic spine MRI 06/02/2017: Facet arthropathy at T9-10, no spinal stenosis  Lumbar spine MRI 05/12/2017L L4-5 severe spinal stenosis    ASSESMENT:  This is a 61 y.o. male with     Problem List Items Addressed This Visit     None      Visit Diagnoses     Cervical myelopathy    -  Primary    Relevant Orders    MRI Cervical Spine Without Contrast    X-Ray Cervical Spine AP And Lateral    Spinal stenosis of lumbar region with neurogenic claudication        Relevant Orders    X-Ray Lumbar Spine Ap And Lateral            PLAN:  I explained the natural history of the disease and all treatment options. I recommended a L4-5 laminectomy, medial facetectomy to improve his neurogenic claudication symptoms.      We have discussed the risks of surgery including bleeding, infection, failure of surgery, CSF leak, nerve root injury, spinal cord injury, weakness, paralysis, peripheral neuropathy, need for reoperation. Patient understands the risks and would like to proceed with surgery.    Cervical spine MRI to explain the myelopathy symptoms and signs.      The patient has increase perioperative risks because of these comorbidities: smoker.         Praveen Raphael MD  Pager: [phone]

## 2017-08-08 ENCOUNTER — TELEPHONE (OUTPATIENT)
Dept: NEUROSURGERY | Facility: CLINIC | Age: 62
End: 2017-08-08

## 2017-08-08 DIAGNOSIS — M48.062 LUMBAR STENOSIS WITH NEUROGENIC CLAUDICATION: Primary | ICD-10-CM

## 2017-08-15 ENCOUNTER — HOSPITAL ENCOUNTER (OUTPATIENT)
Dept: RADIOLOGY | Facility: HOSPITAL | Age: 62
Discharge: HOME | End: 2017-08-15
Attending: NEUROLOGICAL SURGERY
Payer: MEDICAID

## 2017-08-15 DIAGNOSIS — G95.9 CERVICAL MYELOPATHY: ICD-10-CM

## 2017-08-15 PROCEDURE — 72141 MRI NECK SPINE W/O DYE: CPT | Mod: 26,,, | Performed by: RADIOLOGY

## 2017-08-15 PROCEDURE — 72141 MRI NECK SPINE W/O DYE: CPT | Mod: TC

## 2017-08-24 PROBLEM — H35.9 RETINAL LESION: Status: ACTIVE | Noted: 2017-08-24

## 2017-08-24 PROBLEM — E11.9 TYPE 2 DIABETES MELLITUS WITHOUT RETINOPATHY: Status: ACTIVE | Noted: 2017-08-24

## 2017-08-24 PROBLEM — Z87.828 HISTORY OF CORNEAL ABRASION: Status: ACTIVE | Noted: 2017-08-24

## 2017-08-24 PROBLEM — H11.003 PTERYGIUM OF BOTH EYES: Status: ACTIVE | Noted: 2017-08-24

## 2017-08-24 PROBLEM — H25.13 AGE-RELATED NUCLEAR CATARACT OF BOTH EYES: Status: ACTIVE | Noted: 2017-08-24

## 2017-08-24 PROBLEM — H35.033 HYPERTENSIVE RETINOPATHY OF BOTH EYES: Status: ACTIVE | Noted: 2017-08-24

## 2017-08-31 ENCOUNTER — ANESTHESIA EVENT (OUTPATIENT)
Dept: SURGERY | Facility: HOSPITAL | Age: 62
End: 2017-08-31
Payer: MEDICAID

## 2017-08-31 ENCOUNTER — HOSPITAL ENCOUNTER (OUTPATIENT)
Dept: PREADMISSION TESTING | Facility: HOSPITAL | Age: 62
Discharge: HOME | End: 2017-08-31
Attending: NEUROLOGICAL SURGERY
Payer: MEDICAID

## 2017-08-31 VITALS
DIASTOLIC BLOOD PRESSURE: 80 MMHG | RESPIRATION RATE: 16 BRPM | SYSTOLIC BLOOD PRESSURE: 136 MMHG | OXYGEN SATURATION: 99 % | HEIGHT: 67 IN | HEART RATE: 80 BPM

## 2017-08-31 DIAGNOSIS — Z01.818 PREOP TESTING: Primary | ICD-10-CM

## 2017-08-31 RX ORDER — SODIUM CHLORIDE, SODIUM LACTATE, POTASSIUM CHLORIDE, CALCIUM CHLORIDE 600; 310; 30; 20 MG/100ML; MG/100ML; MG/100ML; MG/100ML
INJECTION, SOLUTION INTRAVENOUS CONTINUOUS
Status: CANCELLED | OUTPATIENT
Start: 2017-08-31

## 2017-08-31 RX ORDER — LIDOCAINE HYDROCHLORIDE 10 MG/ML
1 INJECTION, SOLUTION EPIDURAL; INFILTRATION; INTRACAUDAL; PERINEURAL ONCE
Status: CANCELLED | OUTPATIENT
Start: 2017-08-31 | End: 2017-08-31

## 2017-08-31 NOTE — ANESTHESIA PREPROCEDURE EVALUATION
08/31/2017  Carmella Galo is a 61 y.o., male is scheduled for L4-5 laminectomy, medial facectomy under GETA on 9/07/2017.    Pt is Panamanian speaking only and  via language line used for entire visit.      Outside PCP H&P, clearance with outside labs is pending via fax on 8/31/2017.    ANES-RELATED MED/SURG 2017-09-07  HTN  DM  HLD    Past Surgical History:   Procedure Laterality Date    CHOLECYSTECTOMY  2015     ANES-RELATED HOME Rx  Lisinopril-HCTZ metformin  atorvastatin    Anesthesia Evaluation    I have reviewed the Patient Summary Reports.    I have reviewed the Nursing Notes.   I have reviewed the Medications.     Review of Systems  Anesthesia Hx:  No problems with previous Anesthesia  History of prior surgery of interest to airway management or planning: Previous anesthesia: General  Denies Personal Hx of Anesthesia complications.   Social:  No Alcohol Use, Former Smoker    Hematology/Oncology:  Hematology Normal        EENT/Dental:EENT/Dental Normal   Cardiovascular:   Exercise tolerance: good Hypertension, well controlled Denies Dysrhythmias.   Denies Angina. hyperlipidemia     ECG has been reviewed.    Pulmonary:   Denies Shortness of breath.    Renal/:  Renal/ Normal     Hepatic/GI:   GERD, well controlled    Musculoskeletal:   Arthritis     Neurological:  Neurology Normal    Endocrine:   Diabetes, type 2  Diabetes, Type 2 Diabetes , controlled by oral hypoglycemics. on does not recall last A1c; states well controlled.      Wt Readings from Last 1 Encounters:   09/07/17 93 kg (205 lb)     Temp Readings from Last 1 Encounters:   09/07/17 37.2 °C (99 °F) (Oral)     BP Readings from Last 1 Encounters:   09/07/17 137/83     Pulse Readings from Last 1 Encounters:   09/07/17 94     SpO2 Readings from Last 1 Encounters:   08/31/17 99%       Physical Exam  General:  Well nourished     Airway/Jaw/Neck:  Airway Findings: Mouth Opening: Normal Tongue: Normal  General Airway Assessment: Adult  Mallampati: III  TM Distance: Normal, at least 6 cm        Eyes/Ears/Nose:  EYES/EARS/NOSE FINDINGS: Normal   Dental:  Dental Findings: In tact   Chest/Lungs:  Chest/Lungs Clear    Heart/Vascular:  Heart Findings: Normal Heart murmur: negative    Abdomen:  Abdomen Findings: Normal    Musculoskeletal:  Musculoskeletal Findings: (lumbar spine) Tender Joint     Mental Status:  Mental Status Findings: Normal      Lab Results   Component Value Date    WBC 10.10 04/17/2017    HGB 13.4 (L) 04/17/2017    HCT 41.4 04/17/2017    MCV 84 04/17/2017     04/17/2017       Chemistry        Component Value Date/Time     (L) 04/17/2017 1819    K 3.4 (L) 04/17/2017 1819     04/17/2017 1819    CO2 25 04/17/2017 1819    BUN 14 04/17/2017 1819    CREATININE 0.8 04/17/2017 1819     (H) 04/17/2017 1819        Component Value Date/Time    CALCIUM 8.8 04/17/2017 1819    ALKPHOS 89 04/17/2017 1819    AST 45 (H) 04/17/2017 1819    ALT 83 (H) 04/17/2017 1819    BILITOT 0.4 04/17/2017 1819    ESTGFRAFRICA >60.0 04/17/2017 1819    EGFRNONAA >60.0 04/17/2017 1819            Lab Results   Component Value Date    ALBUMIN 3.9 04/17/2017      Lab Results   Component Value Date    TSH 1.040 04/17/2017       CXR 2017-04-17  Heart size is normal. No acute airspace disease or effusion seen.  Multilevel osteophytes are present thoracic spine    EKG 4/17/2017  Normal sinus rhythm  Within normal limits  When compared with ECG of 29-MAR-2017 20:21,  Criteria for Posterior Infarct - Age indeterminate, or probably old are no  longer present  Confirmed by Marika DUFFY, Dakota ([id number]) on 4/18/2017 9:29:05 AM    Anesthesia Plan  Type of Anesthesia, risks & benefits discussed:  Anesthesia Type:  general  Patient's Preference:   Intra-op Monitoring Plan:   Intra-op Monitoring Plan Comments:   Post Op Pain Control Plan:   Post Op Pain  Control Plan Comments:   Induction:   IV  Beta Blocker:  Patient is not currently on a Beta-Blocker (No further documentation required).       Informed Consent: Patient understands risks and agrees with Anesthesia plan.  Questions answered.   ASA Score: 2     Day of Surgery Review of History & Physical:        Anesthesia Plan Notes: Anesthesia consent will be obtained prior to procedure on 9/07/2017.    Pt is Bengali speaking only and  via language line used for entire visit.      Outside PCP H&P, clearance with outside labs is pending via fax on 8/31/2017.            Ready For Surgery From Anesthesia Perspective.

## 2017-08-31 NOTE — DISCHARGE INSTRUCTIONS
Your surgery is scheduled for 9/7/17.    Please report to Outpatient Surgery Intake Office on the 2nd FLOOR at 8:15a.m.          INSTRUCTIONS IMPORTANT!!!  ¨ Do not eat or drink after 12 midnight-including water. OK to brush teeth, no   gum, candy or mints!    ¨ Take only these medicines with a small swallow of water-morning of surgery: Blood pressure medication        ____  Proceed to [facility] on 8/31/17 for additional blood test.      ____  No powder, lotions or creams to surgical area.  ____  Please remove all jewelry, including piercings and leave at home.  ____  No money or valuables needed. Please leave at home.  ____  Please bring any documents given by your doctor.  ____  If going home the same day, arrange for a ride home. You will not be able to             drive if Anesthesia was used.  ____  Wear loose fitting clothing. Allow for dressings, bandages.  ____  Stop Aspirin, Ibuprofen, Motrin and Aleve at least 3-5 days before surgery, unless otherwise instructed by your doctor, or the nurse.   You MAY use Tylenol/acetaminophen until day of surgery.  ____  Wash the surgical area with Hibiclens the night before surgery, and again the             morning of surgery.  Be sure to rinse hibiclens off completely (if instructed by nurse).  ____  If you take diabetic medication, do not take am of surgery unless instructed by Doctor.  ____  Call MD for temperature above 101 degrees.  ____ Stop taking any Fish Oil supplement or any Vitamins that contain Vitamin E at least 5 days prior to surgery.  ____ Do Not wear your contact lenses the day of your procedure.  You may wear your glasses.        I have read or had read and explained to me, and understand the above information.  Additional comments or instructions:  For additional questions call [phone]     Take a Hibiclens shower twice a day for 3 days prior to surgery, including the morning of surgery.   Gargle with Listerine twice a day for 3 days  prior to surgery, including the morning of surgery.      Pre-Op Bathing Instructions    Before surgery, you can play an important role in your own health.    Because skin is not sterile, we need to be sure that your skin is as free of germs as possible. By following the instructions below, you can reduce the number of germs on your skin before surgery.    IMPORTANT: You will need to shower with a special soap called Hibiclens*, available at any pharmacy.  If you are allergic to Chlorhexidine (the antiseptic in Hibiclens), use an antibacterial soap such as Dial Soap for your preoperative shower.  You will shower with Hibiclens both the night before your surgery and the morning of your surgery.  Do not use Hibiclens on the head, face or genitals to avoid injury to those areas.    STEP #1: THE NIGHT BEFORE YOUR SURGERY     1. Do not shave the area of your body where your surgery will be performed.  2. Shower and wash your hair and body as usual with your normal soap and shampoo.  3. Rinse your hair and body thoroughly after you shower to remove all soap residue.  4. With your hand, apply one packet of Hibiclens soap to the surgical site.   5. Wash the site gently for five (5) minutes. Do not scrub your skin too hard.   6. Do not wash with your regular soap after Hibiclens is used.  7. Rinse your body thoroughly.  8. Pat yourself dry with a clean, soft towel.  9. Do not use lotion, cream, or powder.  10. Wear clean clothes.    STEP #2: THE MORNING OF YOUR SURGERY     1. Repeat Step #1.    * Not to be used by people allergic to Chlorhexidine.            Gây mê (Anesthesia): Gây mê toàn thân  Quý v? banerjee?n b? ???c ph?u thu?t. Asael khi ph?u thu?t, quý v? s? ???c áp d?ng bi?n pháp y t? g?i là gây mê (anesthesia). (Còn ???c g?i là gây tê.) Bi?n pháp này s? giúp quý v? th?y tho?i mái eliazar hoff c?m th?y ?au. Nhân viên gây mê c?a quý v? s? s? d?isai gây mê totracey thân (general anesthesia). B?ng này s? gihaven quý v? bi?t rõ h?n v? ki?u  gây mê này.  Gây mê toàn thân là gì?     B?n ?ang samantha dõi liên t?c kayleigh katz?t quá trình c?a b?n b?i nhà cung c?p gây mê.   Gây mê toàn thân ??a quý v? vào m?t tr?ng thái gi?ng nh? gi?c ng? sâu. Thu?c gây mê ?i vào kayleigh máu (Gây mê t?nh m?ch), vào kayleigh ph?i (gây mê khí), ho?c c? sophia. Quý v? s? không c?m th?y gì kayleigh khi ph?u thu?t. Quý v? s? không nh? gì v? nó. Kayleigh khi ph?u thu?t, nhân viên gây mêm s? liên t?c samantha dõi quý v?. Ng??i này s? ki?m tra s? l?n ??p và nh?p vi, scar?t áp, hô h?p và m?c ôxi kayleigh máu c?a quý v?.  · Gây mê t?nh m?ch ([facility]). Thu?c gây mê t?nh m?ch ???c tiêm christie ?ng t?nh m?ch vào cánh sue c?a quý v?. Th??ng thì bi?n pháp này ???c áp d?ng tr??c. Làm v?y ?? quý v? ng? thi?p ?i tr??c khi b?t ??u gây mê khí. M?t s? lo?i thu?c gây mê t?nh m?ch giúp làm gi?m c?n ?au. S? khác khi?n quý v? th?y th? giãn. Bác s? c?a quý v? s? yury?t ??nh ?âu là lo?i t?t nh?t cho tr??ng h?p c?a quý v?.  · Gây mê khí ([facility]). Thu?c gây mê khí ???c hít vào ph?i. Chúng th??ng ???c s? d?ng ?? miles trì tr?ng thái ng? c?a quý v?. Chúng có th? ???c h?p th? thông christie m?t n? ho?c ?ng ??t kayleigh karel qu?n ho?c khí qu?n (?ng hô h?p) c?a quý v?.  ¨ N?u quý v? jeanette m?t n?, nhân viên gây mê c?a quý v? s? th??ng ?eo m?t n? chùm christie m?i và mi?ng c?a quý v? khi quý v? còn ?ang th?c. Quý v? s? hít th? ôxi christie m?t n? kayleigh khi quý v? ???c gây mê t?nh m?ch. Thu?c gây mê khí ???c cho thông christie m?t n?.  ¨ N?u quý v? có m?t ?ng d?n kayleigh karel qu?n ho?c khí qu?n, thu?c gây mêm s? ???c ??a vào c? h?ng c?a quý v? liliana khi quý v? ?ã ng?.  Các d?ng c? và thu?c gây mê  Quý v? s? có th? có:  · Thu?c gây mê t?nh m?ch ???c tiêm christie t?nh m?ch vào máu c?a quý v?.  · Thu?c gây mê khí ???c hít vào ph?i c?a quý v?, t? ?ây thu?c m?i th?m vào máu.  · A Ôxi k? xung ???c g?n ? ??u ngón sue c?a quý v?. D?ng c? này ?o m?c ôxi kayleigh máu c?a quý v?.  · ??u (?i?n c?c) soi tia X nel?n ??ng vi là các t?m ??m neal nh? sherita alex?c c?a quý v?. Các  ?i?n c?c này ?o s? l?n ??p và nh?p vi c?a quý v?.  · A vòng g?ng ?o scar?t áp. Vòng này dùng ?? ??c scar?t áp c?a quý v?.  R?i ro và các bi?n ch?ng có th? có  Gây mê toàn thân có m?t s? r?i ro. Các r?i ro g?m:  · V?n ?? v? hô h?p  · Chóng m?t và bu?n nôn  · ?au h?ng ho?c kh?n gi?ng (th??ng ch? t?m th?i)  · Ph?n ?ng d? ?ng v?i thu?c gây mê  · Nh?p vi b?t th??ng (hi?m khi x?y ra)  · Ng?ng vi (hi?m khi x?y ra)   An toàn kayleigh gây mê  · Làm samantha t?t c? h??ng d?n ???c ??a ra cho quý v? nh? không ???c ?n hay u?ng kayleigh stephan lâu tr??c khi ph?u thu?t.  · Ch?c ch?n r?ng bác s? c?a quý v? bi?t quý v? ?ang dùng các lo?i thu?c nào. Các lo?i thu?c stephan g?m thu?c kê christie qu?y, th?o d??c, thu?c b? tr?, c?n ho?c các lo?i thu?c khác. Quý v? s? ???c h?i l?n cu?i dùng các lo?i thu?c ?ó là vào khi nào.  · Nh? m?t ng??i thân ho?c b?n ?ã tr??ng thành ch? quý v? v? nhà liliana khi ph?u thu?t.  · Kayleigh 24 gi? ??u tiên liliana ca ph?u thu?t c?a quý v?:  ¨ Không ???c lái xe hay s? d?ng thi?t b? n?ng.  ¨ Nh? m?t ng??i thân ?áng tin c?y ho?c v?/ch?ng ra các yury?t ??nh ruben tr?ng ho?c ký tài li?u.  ¨ Tránh u?ng r??u.  ¨ Có ai ?ó ? nhà cùng quý v? n?u có th?. H? có th? samantha dõi xem có v?n ?? gì không và giúp ??m b?o quý v? ???c an toàn.  Date Last Reviewed: 10/16/2014  © 9459-9097 [employer]. [address] [place] [address], [place] [address]. All rights reserved. This information is not intended as a substitute for professional medical care. Always follow your healthcare professional's instructions.        Laminectomy  When to call your healthcare provider  Once at home, call your provider if you have any of the symptoms below:  · Unusual redness, heat, or drainage at the incision site  · Increasing pain, numbness, or weakness in your leg  · Fever over 100.4°F (38°C)  Vertebrae are the bones that make up the spine. Laminectomy is a surgery that removes the part of the vertebra called the lamina. This takes pressure off nerves in the low back and  helps reduce symptoms. A similar surgery is called a laminotomy. For a laminotomy, only part of the lamina is removed.     The entire lamina is removed from the affected vertebra.   Before your surgery  Be sure to follow all of your doctors instructions on preparing for surgery.  · You should stop eating or drinking 10 hours before surgery.  · If you take a daily medicine, ask if you should still take it the morning of surgery.  · If you take any blood-thinning medicines, such as aspirin, discuss them with your doctor at least a week before surgery.  · At the hospital, your temperature, pulse, breathing, and blood pressure will be checked.  · An IV or intravenous line may be started to provide fluids and medications needed during surgery.  During your surgery  · Once in the operating room, you will be given anesthesia.  · After you are asleep, an incision is made near the center of your low back. The incision may be 2 to 6 inches long, depending on how many vertebrae are involved.  · During a laminectomy, the lamina, or bone that forms the back of the spinal canal, is removed from the affected vertebra. The opening created may be enough to take pressure off the nerve. If needed, your doctor can also remove any bone spurs or disk matter pressing on the nerve. After laminectomy, the opening in the spine is protected by the thick back muscles.  · Once the nerve is free of pressure, the incision is closed with stitches or surgical staples.  After your surgery  After surgery, youll be sent to the PACU or postanesthesia care unit. When you are fully awake, youll be moved to your room. The nurses will give you medicines to ease your pain. You may have a small tube called a catheter in your bladder. Soon, healthcare providers will help you get up and moving. Youll also be shown how to keep your lungs clear.  When to call your healthcare provider  Once at home, call your provider if you have any of the symptoms  below:  · Unusual redness, heat, or drainage at the incision site  · Increasing pain, numbness, or weakness in your leg  · Fever over 100.4°F (38°C)   Date Last Reviewed: 2/26/2016  © 2444-0432 [employer]. [address] [place] [address]. All rights reserved. This information is not intended as a substitute for professional medical care. Always follow your healthcare professional's instructions.

## 2017-08-31 NOTE — PRE-PROCEDURE INSTRUCTIONS
Called Dr. Thrasher's office (pt's PCP) to have all testing and clearance faxed.  Message left for nurse to fax clearance and testing to [phone].  PCP's # is [phone]

## 2017-08-31 NOTE — PRE-PROCEDURE INSTRUCTIONS
Pt is [place] and does not speak english.  Free Language line called for interpretation of PAT visit.   # is [phone].  Pt verbalized understanding of all instrructions via the .

## 2017-09-01 NOTE — PLAN OF CARE
Voice message left for nurse in Dr. Thrasher's office to check on status of faxing clearance and pre-op testing. CB# given

## 2017-09-06 ENCOUNTER — TELEPHONE (OUTPATIENT)
Dept: NEUROSURGERY | Facility: CLINIC | Age: 62
End: 2017-09-06

## 2017-09-06 NOTE — TELEPHONE ENCOUNTER
Pt was contacted to have surgical clearance faxed over to Pre-Op center through the language barrier line, spoke with wife Eileen, states pt had clearance from PCP and will get pt to fax over.   Expressed the importance of receiving the clearance by fax today.  Pt verbalized understanding.

## 2017-09-06 NOTE — PLAN OF CARE
Sabine notified of need for clearance from Dr Thrasher.She will contact office nurse for Dr Raphael.

## 2017-09-07 ENCOUNTER — HOSPITAL ENCOUNTER (OUTPATIENT)
Facility: HOSPITAL | Age: 62
Discharge: HOME | End: 2017-09-07
Attending: NEUROLOGICAL SURGERY | Admitting: NEUROLOGICAL SURGERY
Payer: MEDICAID

## 2017-09-07 ENCOUNTER — ANESTHESIA (OUTPATIENT)
Dept: SURGERY | Facility: HOSPITAL | Age: 62
End: 2017-09-07
Payer: MEDICAID

## 2017-09-07 VITALS
HEART RATE: 89 BPM | HEIGHT: 67 IN | TEMPERATURE: 98 F | DIASTOLIC BLOOD PRESSURE: 55 MMHG | OXYGEN SATURATION: 94 % | WEIGHT: 205 LBS | RESPIRATION RATE: 16 BRPM | BODY MASS INDEX: 32.18 KG/M2 | SYSTOLIC BLOOD PRESSURE: 120 MMHG

## 2017-09-07 DIAGNOSIS — M48.062 LUMBAR STENOSIS WITH NEUROGENIC CLAUDICATION: Primary | ICD-10-CM

## 2017-09-07 LAB
ABO + RH BLD: NORMAL
BLD GP AB SCN CELLS X3 SERPL QL: NORMAL
GLUCOSE SERPL-MCNC: 88 MG/DL (ref 70–110)
POCT GLUCOSE: 88 MG/DL (ref 70–110)

## 2017-09-07 PROCEDURE — 25000003 PHARM REV CODE 250: Performed by: PHYSICIAN ASSISTANT

## 2017-09-07 PROCEDURE — 27201423 OPTIME MED/SURG SUP & DEVICES STERILE SUPPLY: Performed by: NEUROLOGICAL SURGERY

## 2017-09-07 PROCEDURE — 63600175 PHARM REV CODE 636 W HCPCS: Performed by: PHYSICIAN ASSISTANT

## 2017-09-07 PROCEDURE — 25000003 PHARM REV CODE 250: Performed by: NURSE ANESTHETIST, CERTIFIED REGISTERED

## 2017-09-07 PROCEDURE — 37000009 HC ANESTHESIA EA ADD 15 MINS: Performed by: NEUROLOGICAL SURGERY

## 2017-09-07 PROCEDURE — 63600175 PHARM REV CODE 636 W HCPCS: Performed by: NURSE ANESTHETIST, CERTIFIED REGISTERED

## 2017-09-07 PROCEDURE — 25000003 PHARM REV CODE 250: Performed by: NURSE PRACTITIONER

## 2017-09-07 PROCEDURE — 71000015 HC POSTOP RECOV 1ST HR: Performed by: NEUROLOGICAL SURGERY

## 2017-09-07 PROCEDURE — 63047 LAM FACETEC & FORAMOT LUMBAR: CPT | Mod: ,,, | Performed by: NEUROLOGICAL SURGERY

## 2017-09-07 PROCEDURE — 63047 LAM FACETEC & FORAMOT LUMBAR: CPT | Mod: AS,,, | Performed by: PHYSICIAN ASSISTANT

## 2017-09-07 PROCEDURE — 86900 BLOOD TYPING SEROLOGIC ABO: CPT

## 2017-09-07 PROCEDURE — 36000710: Performed by: NEUROLOGICAL SURGERY

## 2017-09-07 PROCEDURE — 86901 BLOOD TYPING SEROLOGIC RH(D): CPT

## 2017-09-07 PROCEDURE — 36000711: Performed by: NEUROLOGICAL SURGERY

## 2017-09-07 PROCEDURE — 82962 GLUCOSE BLOOD TEST: CPT | Performed by: NEUROLOGICAL SURGERY

## 2017-09-07 PROCEDURE — 97161 PT EVAL LOW COMPLEX 20 MIN: CPT

## 2017-09-07 PROCEDURE — 71000016 HC POSTOP RECOV ADDL HR: Performed by: NEUROLOGICAL SURGERY

## 2017-09-07 PROCEDURE — 36415 COLL VENOUS BLD VENIPUNCTURE: CPT

## 2017-09-07 PROCEDURE — 63600175 PHARM REV CODE 636 W HCPCS: Performed by: NEUROLOGICAL SURGERY

## 2017-09-07 PROCEDURE — 25000003 PHARM REV CODE 250: Performed by: NEUROLOGICAL SURGERY

## 2017-09-07 PROCEDURE — 37000008 HC ANESTHESIA 1ST 15 MINUTES: Performed by: NEUROLOGICAL SURGERY

## 2017-09-07 PROCEDURE — 71000033 HC RECOVERY, INTIAL HOUR: Performed by: NEUROLOGICAL SURGERY

## 2017-09-07 PROCEDURE — 71000039 HC RECOVERY, EACH ADD'L HOUR: Performed by: NEUROLOGICAL SURGERY

## 2017-09-07 RX ORDER — SODIUM CHLORIDE 0.9 % (FLUSH) 0.9 %
3 SYRINGE (ML) INJECTION EVERY 8 HOURS
Status: DISCONTINUED | OUTPATIENT
Start: 2017-09-07 | End: 2017-09-07 | Stop reason: HOSPADM

## 2017-09-07 RX ORDER — PROPOFOL 10 MG/ML
VIAL (ML) INTRAVENOUS
Status: DISCONTINUED | OUTPATIENT
Start: 2017-09-07 | End: 2017-09-07

## 2017-09-07 RX ORDER — SODIUM CHLORIDE 0.9 % (FLUSH) 0.9 %
3 SYRINGE (ML) INJECTION
Status: DISCONTINUED | OUTPATIENT
Start: 2017-09-07 | End: 2017-09-07 | Stop reason: HOSPADM

## 2017-09-07 RX ORDER — HYDROMORPHONE HYDROCHLORIDE 2 MG/ML
0.2 INJECTION, SOLUTION INTRAMUSCULAR; INTRAVENOUS; SUBCUTANEOUS EVERY 5 MIN PRN
Status: DISCONTINUED | OUTPATIENT
Start: 2017-09-07 | End: 2017-09-07 | Stop reason: HOSPADM

## 2017-09-07 RX ORDER — NEOSTIGMINE METHYLSULFATE 1 MG/ML
INJECTION, SOLUTION INTRAVENOUS
Status: DISCONTINUED | OUTPATIENT
Start: 2017-09-07 | End: 2017-09-07

## 2017-09-07 RX ORDER — GLYCOPYRROLATE 0.2 MG/ML
INJECTION INTRAMUSCULAR; INTRAVENOUS
Status: DISCONTINUED | OUTPATIENT
Start: 2017-09-07 | End: 2017-09-07

## 2017-09-07 RX ORDER — BUPIVACAINE HCL/EPINEPHRINE 0.25-.0005
VIAL (ML) INJECTION
Status: DISCONTINUED | OUTPATIENT
Start: 2017-09-07 | End: 2017-09-07 | Stop reason: HOSPADM

## 2017-09-07 RX ORDER — ACETAMINOPHEN 10 MG/ML
1000 INJECTION, SOLUTION INTRAVENOUS
Status: COMPLETED | OUTPATIENT
Start: 2017-09-07 | End: 2017-09-07

## 2017-09-07 RX ORDER — TRAMADOL HYDROCHLORIDE 50 MG/1
TABLET ORAL
Qty: 60 TABLET | Refills: 0 | Status: SHIPPED | OUTPATIENT
Start: 2017-09-07 | End: 2017-09-22 | Stop reason: SDUPTHER

## 2017-09-07 RX ORDER — ONDANSETRON 8 MG/1
8 TABLET, ORALLY DISINTEGRATING ORAL EVERY 6 HOURS PRN
Status: DISCONTINUED | OUTPATIENT
Start: 2017-09-07 | End: 2017-09-07 | Stop reason: HOSPADM

## 2017-09-07 RX ORDER — HYDROMORPHONE HYDROCHLORIDE 2 MG/ML
0.4 INJECTION, SOLUTION INTRAMUSCULAR; INTRAVENOUS; SUBCUTANEOUS EVERY 5 MIN PRN
Status: DISCONTINUED | OUTPATIENT
Start: 2017-09-07 | End: 2017-09-07 | Stop reason: HOSPADM

## 2017-09-07 RX ORDER — CYCLOBENZAPRINE HCL 10 MG
10 TABLET ORAL 3 TIMES DAILY PRN
Status: DISCONTINUED | OUTPATIENT
Start: 2017-09-07 | End: 2017-09-07 | Stop reason: HOSPADM

## 2017-09-07 RX ORDER — VANCOMYCIN HYDROCHLORIDE 1 G/20ML
INJECTION, POWDER, LYOPHILIZED, FOR SOLUTION INTRAVENOUS
Status: DISCONTINUED | OUTPATIENT
Start: 2017-09-07 | End: 2017-09-07 | Stop reason: HOSPADM

## 2017-09-07 RX ORDER — OXYCODONE HYDROCHLORIDE 5 MG/1
5 TABLET ORAL EVERY 4 HOURS PRN
Status: DISCONTINUED | OUTPATIENT
Start: 2017-09-07 | End: 2017-09-07 | Stop reason: HOSPADM

## 2017-09-07 RX ORDER — PREGABALIN 75 MG/1
75 CAPSULE ORAL
Status: COMPLETED | OUTPATIENT
Start: 2017-09-07 | End: 2017-09-07

## 2017-09-07 RX ORDER — MUPIROCIN 20 MG/G
1 OINTMENT TOPICAL
Status: DISCONTINUED | OUTPATIENT
Start: 2017-09-07 | End: 2017-09-07 | Stop reason: HOSPADM

## 2017-09-07 RX ORDER — TRAMADOL HYDROCHLORIDE 50 MG/1
100 TABLET ORAL EVERY 6 HOURS PRN
Status: DISCONTINUED | OUTPATIENT
Start: 2017-09-07 | End: 2017-09-07 | Stop reason: HOSPADM

## 2017-09-07 RX ORDER — SODIUM CHLORIDE, SODIUM LACTATE, POTASSIUM CHLORIDE, CALCIUM CHLORIDE 600; 310; 30; 20 MG/100ML; MG/100ML; MG/100ML; MG/100ML
INJECTION, SOLUTION INTRAVENOUS CONTINUOUS
Status: DISCONTINUED | OUTPATIENT
Start: 2017-09-07 | End: 2017-09-07 | Stop reason: HOSPADM

## 2017-09-07 RX ORDER — BACITRACIN 50000 [IU]/1
INJECTION, POWDER, FOR SOLUTION INTRAMUSCULAR
Status: DISCONTINUED | OUTPATIENT
Start: 2017-09-07 | End: 2017-09-07 | Stop reason: HOSPADM

## 2017-09-07 RX ORDER — LIDOCAINE HYDROCHLORIDE 10 MG/ML
1 INJECTION, SOLUTION EPIDURAL; INFILTRATION; INTRACAUDAL; PERINEURAL ONCE
Status: DISCONTINUED | OUTPATIENT
Start: 2017-09-07 | End: 2017-09-07 | Stop reason: HOSPADM

## 2017-09-07 RX ORDER — DEXAMETHASONE SODIUM PHOSPHATE 4 MG/ML
INJECTION, SOLUTION INTRA-ARTICULAR; INTRALESIONAL; INTRAMUSCULAR; INTRAVENOUS; SOFT TISSUE
Status: DISCONTINUED | OUTPATIENT
Start: 2017-09-07 | End: 2017-09-07

## 2017-09-07 RX ORDER — PHENYLEPHRINE HYDROCHLORIDE 10 MG/ML
INJECTION INTRAVENOUS
Status: DISCONTINUED | OUTPATIENT
Start: 2017-09-07 | End: 2017-09-07

## 2017-09-07 RX ORDER — LIDOCAINE HCL/PF 100 MG/5ML
SYRINGE (ML) INTRAVENOUS
Status: DISCONTINUED | OUTPATIENT
Start: 2017-09-07 | End: 2017-09-07

## 2017-09-07 RX ORDER — FENTANYL CITRATE 50 UG/ML
INJECTION, SOLUTION INTRAMUSCULAR; INTRAVENOUS
Status: DISCONTINUED | OUTPATIENT
Start: 2017-09-07 | End: 2017-09-07

## 2017-09-07 RX ORDER — MIDAZOLAM HYDROCHLORIDE 1 MG/ML
INJECTION, SOLUTION INTRAMUSCULAR; INTRAVENOUS
Status: DISCONTINUED | OUTPATIENT
Start: 2017-09-07 | End: 2017-09-07

## 2017-09-07 RX ORDER — OXYCODONE HCL 10 MG/1
10 TABLET, FILM COATED, EXTENDED RELEASE ORAL
Status: COMPLETED | OUTPATIENT
Start: 2017-09-07 | End: 2017-09-07

## 2017-09-07 RX ORDER — VASOPRESSIN 20 [USP'U]/ML
INJECTION, SOLUTION INTRAMUSCULAR; SUBCUTANEOUS
Status: DISCONTINUED | OUTPATIENT
Start: 2017-09-07 | End: 2017-09-07

## 2017-09-07 RX ORDER — OXYCODONE HYDROCHLORIDE 5 MG/1
5 TABLET ORAL EVERY 4 HOURS PRN
Qty: 20 TABLET | Refills: 0 | Status: SHIPPED | OUTPATIENT
Start: 2017-09-07 | End: 2017-09-22 | Stop reason: ALTCHOICE

## 2017-09-07 RX ORDER — ONDANSETRON 2 MG/ML
INJECTION INTRAMUSCULAR; INTRAVENOUS
Status: DISCONTINUED | OUTPATIENT
Start: 2017-09-07 | End: 2017-09-07

## 2017-09-07 RX ORDER — CYCLOBENZAPRINE HCL 10 MG
10 TABLET ORAL 3 TIMES DAILY PRN
Qty: 30 TABLET | Refills: 0 | Status: SHIPPED | OUTPATIENT
Start: 2017-09-07 | End: 2017-09-17

## 2017-09-07 RX ORDER — ROCURONIUM BROMIDE 10 MG/ML
INJECTION, SOLUTION INTRAVENOUS
Status: DISCONTINUED | OUTPATIENT
Start: 2017-09-07 | End: 2017-09-07

## 2017-09-07 RX ORDER — CELECOXIB 100 MG/1
200 CAPSULE ORAL
Status: COMPLETED | OUTPATIENT
Start: 2017-09-07 | End: 2017-09-07

## 2017-09-07 RX ORDER — CYCLOBENZAPRINE HCL 10 MG
10 TABLET ORAL
Status: COMPLETED | OUTPATIENT
Start: 2017-09-07 | End: 2017-09-07

## 2017-09-07 RX ORDER — CEFAZOLIN SODIUM 2 G/50ML
2 SOLUTION INTRAVENOUS
Status: COMPLETED | OUTPATIENT
Start: 2017-09-07 | End: 2017-09-07

## 2017-09-07 RX ADMIN — VASOPRESSIN 1 UNITS: 20 INJECTION INTRAVENOUS at 12:09

## 2017-09-07 RX ADMIN — PHENYLEPHRINE HYDROCHLORIDE 100 MCG: 10 INJECTION INTRAVENOUS at 12:09

## 2017-09-07 RX ADMIN — ACETAMINOPHEN 1000 MG: 10 INJECTION, SOLUTION INTRAVENOUS at 08:09

## 2017-09-07 RX ADMIN — SODIUM CHLORIDE, SODIUM LACTATE, POTASSIUM CHLORIDE, AND CALCIUM CHLORIDE: .6; .31; .03; .02 INJECTION, SOLUTION INTRAVENOUS at 08:09

## 2017-09-07 RX ADMIN — PHENYLEPHRINE HYDROCHLORIDE 100 MCG: 10 INJECTION INTRAVENOUS at 11:09

## 2017-09-07 RX ADMIN — EPHEDRINE SULFATE 15 MG: 50 INJECTION, SOLUTION INTRAMUSCULAR; INTRAVENOUS; SUBCUTANEOUS at 11:09

## 2017-09-07 RX ADMIN — PHENYLEPHRINE HYDROCHLORIDE 200 MCG: 10 INJECTION INTRAVENOUS at 12:09

## 2017-09-07 RX ADMIN — CEFAZOLIN SODIUM 2 G: 2 SOLUTION INTRAVENOUS at 11:09

## 2017-09-07 RX ADMIN — GLYCOPYRROLATE 0.4 MG: 0.2 INJECTION, SOLUTION INTRAMUSCULAR; INTRAVENOUS at 12:09

## 2017-09-07 RX ADMIN — NEOSTIGMINE METHYLSULFATE 2.5 MG: 1 INJECTION INTRAVENOUS at 12:09

## 2017-09-07 RX ADMIN — LIDOCAINE HYDROCHLORIDE 80 MG: 20 INJECTION, SOLUTION INTRAVENOUS at 10:09

## 2017-09-07 RX ADMIN — PREGABALIN 75 MG: 75 CAPSULE ORAL at 08:09

## 2017-09-07 RX ADMIN — PROPOFOL 150 MG: 10 INJECTION, EMULSION INTRAVENOUS at 10:09

## 2017-09-07 RX ADMIN — CYCLOBENZAPRINE HYDROCHLORIDE 10 MG: 10 TABLET, FILM COATED ORAL at 08:09

## 2017-09-07 RX ADMIN — OXYCODONE HYDROCHLORIDE 5 MG: 5 TABLET ORAL at 04:09

## 2017-09-07 RX ADMIN — CELECOXIB 200 MG: 100 CAPSULE ORAL at 08:09

## 2017-09-07 RX ADMIN — FENTANYL CITRATE 100 MCG: 50 INJECTION, SOLUTION INTRAMUSCULAR; INTRAVENOUS at 10:09

## 2017-09-07 RX ADMIN — ONDANSETRON 4 MG: 2 INJECTION, SOLUTION INTRAMUSCULAR; INTRAVENOUS at 12:09

## 2017-09-07 RX ADMIN — PROPOFOL 50 MG: 10 INJECTION, EMULSION INTRAVENOUS at 11:09

## 2017-09-07 RX ADMIN — SODIUM CHLORIDE, SODIUM LACTATE, POTASSIUM CHLORIDE, AND CALCIUM CHLORIDE: .6; .31; .03; .02 INJECTION, SOLUTION INTRAVENOUS at 10:09

## 2017-09-07 RX ADMIN — ROCURONIUM BROMIDE 30 MG: 10 INJECTION, SOLUTION INTRAVENOUS at 10:09

## 2017-09-07 RX ADMIN — EPHEDRINE SULFATE 10 MG: 50 INJECTION, SOLUTION INTRAMUSCULAR; INTRAVENOUS; SUBCUTANEOUS at 12:09

## 2017-09-07 RX ADMIN — DEXAMETHASONE SODIUM PHOSPHATE 4 MG: 4 INJECTION, SOLUTION INTRAMUSCULAR; INTRAVENOUS at 11:09

## 2017-09-07 RX ADMIN — GLYCOPYRROLATE 0.2 MG: 0.2 INJECTION, SOLUTION INTRAMUSCULAR; INTRAVENOUS at 11:09

## 2017-09-07 RX ADMIN — MIDAZOLAM 2 MG: 1 INJECTION INTRAMUSCULAR; INTRAVENOUS at 10:09

## 2017-09-07 RX ADMIN — OXYCODONE HYDROCHLORIDE 10 MG: 10 TABLET, FILM COATED, EXTENDED RELEASE ORAL at 08:09

## 2017-09-07 RX ADMIN — FENTANYL CITRATE 50 MCG: 50 INJECTION, SOLUTION INTRAMUSCULAR; INTRAVENOUS at 11:09

## 2017-09-07 RX ADMIN — OXYCODONE HYDROCHLORIDE 5 MG: 5 TABLET ORAL at 02:09

## 2017-09-07 NOTE — DISCHARGE SUMMARY
[facility]  Neurosurgery  Discharge Summary      Patient Name: Carmella Galo  MRN: [id number]  Admission Date: 9/7/2017  Hospital Length of Stay: 0 days  Discharge Date and Time: 9/7/2017  5:17 PM  Attending Physician: Praveen Raphael MD   Discharging Provider: Sabine Badillo PA-C  Primary Care Provider: Maria Del Rosario Thrasher NP     HPI: Carmella Galo is a 61 y.o. male who has been experiencing worsening thoracic back pain and left leg pain for many years. The back pain is constant and severe. Back pain is worse than leg pain. Leg pain is triggered by walking and radiates into the L5 and S1 distribution of the left leg. He cannot walk long distances because of the pain. He has tried injections and PT with some pain relief. Dr. Raphael recommended L4-5 laminectomy to treat severe stenosis and neurogenic claudicaiton. Risks and benefits explained. Patient wished to proceed.     Procedure(s) (LRB):  LAMINECTOMY-LUMBAR L4-5 laminectomy, medial facectomy (N/A)     DATE OF PROCEDURE: 9/07/2017     PREOPERATIVE DIAGNOSES:  1. Lumbar spinal stenosis at L4-5 with neurogenic claudication     POSTOPERATIVE DIAGNOSES:  1. Lumbar spinal stenosis at L4-5 with neurogenic claudication     PROCEDURES:     1. Left L4-5 laminectomy, medial facetectomy and foraminotomy  2. Fluoroscopy.  3. Microscope assistance     PRIMARY SURGEON: Praveen Raphael M.D.     ASSISTANT SURGEON: DEVAN Scott    Hospital Course: Carmella Galo presented to [facility] on 9/7/2017 for the above stated procedure. he tolerated the procedure well and there were no intra-operative complications. he recovered in PACU and was then transferred to outpatient recovery, where his pain was controlled. he was evaluated by PT who recommended discharge home with family support. On 9/7/2017, he was discharged home with pain medication and follow up appointments. Regular diet. Activity as tolerated. At the time of discharge, vital signs were stable, patient was  afebrile and neurologically stable. Discharge instructions were given verbally/written to the patient and his family and all of their questions were answered. Patient and family voiced understanding. They were encouraged to call the clinic with any questions they might have prior to the follow up appointments.      Consults: PT    Significant Diagnostic Studies: None     Pending Diagnostic Studies:     None        Final Active Diagnoses:    Diagnosis Date Noted POA    PRINCIPAL PROBLEM:  Lumbar stenosis with neurogenic claudication [M48.06] 09/07/2017 Yes      Problems Resolved During this Admission:    Diagnosis Date Noted Date Resolved POA      Discharged Condition: good    Disposition: Home or Self Care    Follow Up:  Follow-up Information     Sabine Badillo PA-C On 9/22/2017.    Specialty:  Neurosurgery  Contact information:  [address] [place] [address]  [address]  [place] [id number] [phone]                 Patient Instructions:     Diet general     Call MD for:  temperature >100.4     Call MD for:  persistent nausea and vomiting     Call MD for:  severe uncontrolled pain     Call MD for:  difficulty breathing, headache or visual disturbances     Call MD for:  redness, tenderness, or signs of infection (pain, swelling, redness, odor or green/yellow discharge around incision site)     Call MD for:  hives     Call MD for:  persistent dizziness or light-headedness     Call MD for:  extreme fatigue     Remove dressing in 48 hours       Medications:  Reconciled Home Medications:   Discharge Medication List as of 9/7/2017  3:34 PM      START taking these medications    Details   cyclobenzaprine (FLEXERIL) 10 MG tablet Take 1 tablet (10 mg total) by mouth 3 (three) times daily as needed for Muscle spasms., Starting u 9/7/2017, Until Sun 9/17/2017, Print      oxycodone (ROXICODONE) 5 MG immediate release tablet Take 1 tablet (5 mg total) by mouth every 4 (four) hours as needed (severe pain)., Starting u 9/7/2017,  Print      tramadol (ULTRAM) 50 mg tablet Take 1-2 tablets (50mg - 100mg total) by mouth every 6 (six) hours as needed for pain, Print         CONTINUE these medications which have NOT CHANGED    Details   atorvastatin (LIPITOR) 20 MG tablet Take 20 mg by mouth nightly., Starting 10/4/2016, Until Discontinued, Historical Med      lisinopril-hydrochlorothiazide (PRINZIDE,ZESTORETIC) 10-12.5 mg per tablet Take 1 tablet by mouth once daily., Starting 10/4/2016, Until Discontinued, Historical Med      metformin (GLUCOPHAGE) 500 MG tablet Take 500 mg by mouth once daily., Starting 10/4/2016, Until Discontinued, Historical Med         STOP taking these medications       hydrocodone-acetaminophen 5-325mg (NORCO) 5-325 mg per tablet Comments:   Reason for Stopping:               Sabine Badillo PA-C  Neurosurgery  [facility]

## 2017-09-07 NOTE — ANESTHESIA POSTPROCEDURE EVALUATION
"Anesthesia Post Evaluation    Patient: Carmella Galo    Procedure(s) Performed: Procedure(s) (LRB):  LAMINECTOMY-LUMBAR L4-5 laminectomy, medial facectomy (N/A)  FORAMINOTOMY (Left)    Final Anesthesia Type: general  Patient location during evaluation: PACU  Patient participation: Yes- Able to Participate  Level of consciousness: awake  Post-procedure vital signs: reviewed and stable  Pain management: adequate  PONV status at discharge: No PONV  Anesthetic complications: no      Cardiovascular status: stable  Respiratory status: unassisted, spontaneous ventilation and room air  Hydration status: euvolemic  Follow-up not needed.        Visit Vitals  /77   Pulse 76   Temp 37.2 °C (99 °F) (Oral)   Resp 20   Ht 5' 7" (1.702 m)   Wt 93 kg (205 lb)   SpO2 (!) 94%   BMI 32.11 kg/m²       Pain/Santi Score: Pain Assessment Performed: Yes (9/7/2017  3:00 PM)  Presence of Pain: denies (9/7/2017  3:00 PM)  Pain Rating Prior to Med Admin: 6 (9/7/2017  2:45 PM)  Pain Rating Post Med Admin: 2 (9/7/2017  3:00 PM)  Santi Score: 10 (9/7/2017  2:45 PM)      "

## 2017-09-07 NOTE — ANESTHESIA RELEASE NOTE
"Anesthesia Release from PACU Note    Patient: Carmella Galo    Procedure(s) Performed: Procedure(s) (LRB):  LAMINECTOMY-LUMBAR L4-5 laminectomy, medial facectomy (N/A)  FORAMINOTOMY (Left)    Anesthesia type: general    Post pain: Adequate analgesia    Post assessment: no apparent anesthetic complications    Last Vitals:   Visit Vitals  /77   Pulse 76   Temp 37.2 °C (99 °F) (Oral)   Resp 20   Ht 5' 7" (1.702 m)   Wt 93 kg (205 lb)   SpO2 (!) 94%   BMI 32.11 kg/m²       Post vital signs: stable    Level of consciousness: awake    Nausea/Vomiting: no nausea/no vomiting    Complications: none    Airway Patency: patent    Respiratory: unassisted    Cardiovascular: stable    Hydration: euvolemic  "

## 2017-09-07 NOTE — PLAN OF CARE
Ok to release patient form pacu per Dr Stearns anesthesia. Patient to ops on stretcher with tech in good condition

## 2017-09-07 NOTE — TRANSFER OF CARE
"Anesthesia Transfer of Care Note    Patient: Carmella Galo    Procedure(s) Performed: Procedure(s) (LRB):  LAMINECTOMY-LUMBAR L4-5 laminectomy, medial facectomy (N/A)  FORAMINOTOMY (Left)    Patient location: PACU    Anesthesia Type: general    Transport from OR: Transported from OR on 6-10 L/min O2 by face mask with adequate spontaneous ventilation    Post pain: adequate analgesia    Post assessment: no apparent anesthetic complications and tolerated procedure well    Post vital signs: stable    Level of consciousness: awake    Nausea/Vomiting: no nausea/vomiting    Complications: none    Transfer of care protocol was followed      Last vitals:   Visit Vitals  /83 (BP Location: Right arm, Patient Position: Lying)   Pulse 94   Temp 37.2 °C (99 °F) (Oral)   Resp 16   Ht 5' 7" (1.702 m)   Wt 93 kg (205 lb)   BMI 32.11 kg/m²     "

## 2017-09-07 NOTE — PLAN OF CARE
POC board updated and reviewed with pt and pt's family. Pt and pt's family verbalize understanding. Safety precautions maintained. Call bell in reach. Non skid socks on. Bed locked and in lowest position. Instructed pt to call for assistance. Pt verbalizes understanding. language line used while getting pt ready for procedure, anesthesia consent, and for POC. Pt's daughter at bedside also available to clarify for pt

## 2017-09-07 NOTE — PT/OT/SLP EVAL
Physical Therapy  Evaluation and Discharge    Carmella Galo   MRN: [id number]   Admitting Diagnosis: Lumbar stenosis with neurogenic claudication    PT Received On: 09/07/17  PT Start Time: 1551     PT Stop Time: 1610    PT Total Time (min): 19 min       Billable Minutes:  Evaluation 19    Diagnosis: Lumbar stenosis with neurogenic claudication  The encounter diagnosis was Lumbar stenosis with neurogenic claudication.    Past Medical History:   Diagnosis Date    Allergy     Chronic pain     Hyperlipidemia     Hypertension     Type 2 diabetes mellitus, controlled       Past Surgical History:   Procedure Laterality Date    CHOLECYSTECTOMY  2015       Referring physician: Sabine Badillo PA-C  Date referred to PT: 9/7/2017    General Precautions: Standard,  (spinal precautions)  Orthopedic Precautions:     Braces: N/A       Do you have any cultural, spiritual, Scientologist conflicts, given your current situation?: none reported to PT    Patient History:  Living Environment Comment: Pt's niece present and provided translation between English-Malawian with pt. Pt reports living with his wife in a trailer with 3 VIVIAN with R HR on ascent. Pt reports his wife will be out of town for 9 more days but he will have family who lives close who can assist as needed. Pt was independent with mobility PTA without AD.  Equipment Currently Used at Home: none  DME owned (not currently used): none    Previous Level of Function:  Ambulation Skills: independent  Transfer Skills: independent  ADL Skills: independent    Subjective:  Communicated with RN prior to session.  PT offered use of language line free of charge to translate between English and Malawian. Pt refused and stated he wants his niece to translate and pt's niece was agreeable.  Chief Complaint: lower back pain  Patient goals: to return home    Pain/Comfort  Pain Rating 1: 5/10  Location - Side 1: Bilateral  Location - Orientation 1: lower  Location 1: back  Pain Addressed 1:  Reposition, Distraction  Pain Rating Post-Intervention 1: 5/10      Objective:   Patient found with: peripheral IV     Cognitive Exam:  Oriented to: Person, Place, Time and Situation    Follows Commands/attention: Follows multistep  commands  Communication: clear/fluent with pt's niece providing translation  Safety awareness/insight to disability: intact    Physical Exam:  Postural examination/scapula alignment: No postural abnormalities identified    Skin integrity: Visible skin intact   Edema: None noted     Sensation:   Intact    Lower Extremity Range of Motion:  Right Lower Extremity: WFL  Left Lower Extremity: WFL    Lower Extremity Strength:  Right Lower Extremity: WFL  Left Lower Extremity: WFL     Fine motor coordination:  Intact    Gross motor coordination: WFL    Functional Mobility:  Bed Mobility:  Rolling/Turning Right: Modified independent  Supine to Sit: Modified Independent  Sit to Supine: Modified Independent    Transfers:  Sit <> Stand Assistance: Supervision  Sit <> Stand Assistive Device: No Assistive Device  Bed <> Chair Assistance: Activity did not occur  Toilet Transfer Technique:  (ambulatory transfer)  Toilet Transfer Assistance: Independent  Toilet Transfer Assistive Device: No Assistive Device    Gait:   Gait Distance: ~150 feet with no AD and supervision. Pt ambulates with wide BLANKA and lateral sway. Pt reports his gait is better than before surgery and that he feels better.  Assistance 1: Supervision  Gait Assistive Device: No device  Gait Pattern: reciprocal  Gait Deviation(s): increased stride width    Stairs:  Activity did not occur    Balance:   Static Sit: GOOD: Takes MODERATE challenges from all directions  Dynamic Sit: GOOD: Maintains balance through MODERATE excursions of active trunk movement  Static Stand: GOOD: Takes MODERATE challenges from all directions  Dynamic stand: GOOD-: Needs SUPERVISION only during gait and able to self right with moderate     Therapeutic Activities  and Exercises:  PT evaluation completed. Pt completed gait training as reported above then completed ~10 ft ambulatory transfer to bathroom without AD independently. Pt and pt's niece educated on logroll method and spinal precautions and verbalized understanding.    AM-PAC 6 CLICK MOBILITY  How much help from another person does this patient currently need?   1 = Unable, Total/Dependent Assistance  2 = A lot, Maximum/Moderate Assistance  3 = A little, Minimum/Contact Guard/Supervision  4 = None, Modified [place]/Independent    Turning over in bed (including adjusting bedclothes, sheets and blankets)?: 4  Sitting down on and standing up from a chair with arms (e.g., wheelchair, bedside commode, etc.): 3  Moving from lying on back to sitting on the side of the bed?: 4  Moving to and from a bed to a chair (including a wheelchair)?: 4  Need to walk in hospital room?: 3  Climbing 3-5 steps with a railing?: 3  Total Score: 21     AM-PAC Raw Score CMS G-Code Modifier Level of Impairment Assistance   6 % Total / Unable   7 - 9 CM 80 - 100% Maximal Assist   10 - 14 CL 60 - 80% Moderate Assist   15 - 19 CK 40 - 60% Moderate Assist   20 - 22 CJ 20 - 40% Minimal Assist   23 CI 1-20% SBA / CGA   24 CH 0% Independent/ Mod I     Patient left sitting up with RN notified and pt's niece present.    Assessment:   Carmella Galo is a 61 y.o. male with a medical diagnosis of Lumbar stenosis with neurogenic claudication and presents with impaired gait mechanics, ambulating with wide BLANKA and lateral sway. Pt lives with his wife who is out of town for another 9 days and pt has family who lives close by and can assist as needed.    Rehab identified problem list/impairments: Rehab identified problem list/impairments: orthopedic precautions (spinal precautions)    Rehab potential is good.    Activity tolerance: Good    Discharge recommendations: Discharge Facility/Level Of Care Needs: home     Barriers to discharge: Barriers to  Discharge: None    Equipment recommendations: Equipment Needed After Discharge: none     GOALS:    Physical Therapy Goals     Not on file          Multidisciplinary Problems (Resolved)        Problem: Physical Therapy Goal    Goal Priority Disciplines Outcome Goal Variances Interventions   Physical Therapy Goal   (Resolved)     PT/OT, PT Outcome(s) achieved     Description:  PT evaluation completed today and pt with d/c orders at this time. Planned d/c home.                    PLAN:    Patient to be seen  (d/c) to address the above listed problems via    Plan of Care expires: 09/07/17  Plan of Care reviewed with: patient (pt's niece)          Airam Guthrie, PT  09/07/2017

## 2017-09-07 NOTE — OP NOTE
DATE OF PROCEDURE: 9/07/2017     PREOPERATIVE DIAGNOSES:  1. Lumbar spinal stenosis at L4-5 with neurogenic claudication     POSTOPERATIVE DIAGNOSES:  1. Lumbar spinal stenosis at L4-5 with neurogenic claudication     PROCEDURES:     1. Left L4-5 laminectomy, medial facetectomy and foraminotomy  2. Fluoroscopy.  3. Microscope assistance     PRIMARY SURGEON: Praveen Raphael M.D.     ASSISTANT SURGEON: DEVAN Scott was the first assistant for this procedure as there was no qualified resident available to assist.      INDICATIONS: This is an 61-year-old male with L4-5 severe stenosis and neurogenic claudication. The risks of the procedure include hemorrhage, infection, paralysis, loss of sensation, loss of sphincter functions, death and postoperative medical complication.      DESCRIPTION OF THE PROCEDURE: The patient was intubated under general   anesthesia. He was placed prone on the Wei table frame. All pressure points   were carefully padded. Using fluoroscopy, 1 x 18 mm incision was planned between L4 and L5 15 mm left to the midline. Incision was made with # 15 blade. Subcutaneous tissue hemostasis was completed and the thoracolumbar fascia was opened with a k-wire. We then inserted the serial dilators and a final 18 mm x 5 cm tubular retractor was docked at the junction of the inferior lamina and the left inferior facet of L4 and the retractor was fixed on the table and then using a    microscope, the multifidus muscle was subperiosteally dissected using the    monopolar. We then exposed the base of the spinous process, the lamina, and the  medial facets. Using the high speed radha, we drilled the base of the spinous    process, the ipsilateral and contralateral lamina, as well as the ipsilateral and contralateral    one-half of the medial facet. The yellow ligament insertion was exposed and    the ligament was resected using Kerrison ipsilaterally and contralaterally.      The bilateral  L5 nerve roots in the lateral receses were decompress by removing 1/4 of the medial superior articulating process of L5. We completed a contralateral L4 foraminotomy.  After the decompression, the dura reexpanded and hemostasis was completed by using gelfoam powder soaked in thrombin in the epidural space.  We copiously irrigated with physiological solution and hemostasis was completed with gel foam powder. We removed the retractor.     The thoracolumbar fascia was closed with 0 Vicryl. The wound's subcutaneous layer were closed with inverted 2-0 Vicryl and the skin was closed with a running subcuticular 4-0    Monocryl. The wounds were dressed with Steri-Strips and the patient was    transferred to the Recovery Room under the care of the anesthesiologist. Blood    loss was 30 mL. There was no complication.

## 2017-09-07 NOTE — DISCHARGE INSTRUCTIONS
Patient Information  -No driving while taking narcotic pain medications  -Do not take any OTC products containing acetaminophen at the same time as you take your narcotic pain medication. Medications that may contain acetaminophen include but are not limited to: Excedrin and other headache medications, arthritis medications, cold and sinus medications, etc. Please review the list of active ingredients on any OTC medication prior to taking it.  -Do not take any Aspirin or Aspirin containing products for 2 weeks  -Do not take any Aleeve, Naprosyn, Naproxen, Ibuprofen, Advil or any other NSAID for 6 weeks.   -Do not consume any alcoholic beverages until released by your Neurosurgeon  -Do not perform any excessive bending over or leaning forward as this is a fall hazard.  -Do not perform any heavy lifting or lifting more than 10 lbs from the ground level as this is a fall hazard.    Contact the Neurosurgery Office immediately if:  -If you begin to notice any neurologic changes such as:           -Sudden onset of lethargy or sleepiness           -Sudden confusion, trouble speaking, or understanding            -Sudden trouble seeing in one or both eyes            -Sudden trouble walking, dizziness, loss of coordination            -Sudden severe headache with no known cause            -Sudden onset of numbness or weakness     Wound Care:  Remove bandage on the 2nd day after surgery, then keep your incision open to air. There are white tape strips called steri strips under your bandage. These will fall off on their own. Do not remove them. You may shower on Day 2. Have the stream of water hit you opposite from the incision. Do not scrub the incision. Pat the incision dry after your shower. You cannot take a bath/swim/submerge the incision until 8 weeks after surgery.    Call your doctor or go to the Emergency Room for any signs of infection including: increased redness, drainage, pain or fever (temperature greater than or  equal to 101.4).       Miscellaneous:  -Follow up with Neurosurgery in 2 weeks for wound check  -Remain active with walking and other light activities daily.  No heavy lifting, no extreme bending or twisting.            Neurosurgery Office:   [address] [facility], [address]  [place] [address]  Telephone [phone]    DIET: You may resume your home diet. If nausea is present, increase your diet gradually with fluids and bland foods    ACTIVITY LEVEL: If you have received sedation or an anesthetic, you may feel sleepy for several hours. Rest until you are more awake. Gradually resume your normal activities    Medications: Pain medication should be taken only if needed and as directed. If antibiotics are prescribed, the medication should be taken until completed. You will be given an updated list of you medications.    No driving, alcoholic beverages or signing legal documents for next 24 hours or while taking pain medication.       CALL THE DOCTOR:    For any obvious bleeding (some dried blood over the incision is normal).      Redness, swelling, foul smell around incision or fever over 101.   Shortness of breath, Coughing up Bloody sputum, Pains or Swelling in your Calves .   Persistent pain or nausea not relieved by medication.    If any unusual problems or difficulties occur contact your doctor. If you cannot contact your doctor but feel your signs and symptoms warrant a physicians attention return to the emergency room.      Discharge Instructions: After Your Surgery  Youve just had surgery. During surgery you were given medicine called anesthesia to keep you relaxed and free of pain. After surgery you may have some pain or nausea. This is common. Here are some tips for feeling better and getting well after surgery.     Stay on schedule with your medication.   Going home  Your doctor or nurse will show you how to take care of yourself when you go home. He or she will also answer your questions. Have an  adult family member or friend drive you home. For the first 24 hours after your surgery:  · Do not drive or use heavy equipment.  · Do not make important decisions or sign legal papers.  · Do not drink alcohol.  · Have someone stay with you, if needed. He or she can watch for problems and help keep you safe.  Be sure to go to all follow-up visits with your doctor. And rest after your surgery for as long as your doctor tells you to.  Coping with pain  If you have pain after surgery, pain medicine will help you feel better. Take it as told, before pain becomes severe. Also, ask your doctor or pharmacist about other ways to control pain. This might be with heat, ice, or relaxation. And follow any other instructions your surgeon or nurse gives you.  Tips for taking pain medicine  To get the best relief possible, remember these points:  · Pain medicines can upset your stomach. Taking them with a little food may help.  · Most pain relievers taken by mouth need at least 20 to 30 minutes to start to work.  · Taking medicine on a schedule can help you remember to take it. Try to time your medicine so that you can take it before starting an activity. This might be before you get dressed, go for a walk, or sit down for dinner.  · Constipation is a common side effect of pain medicines. Call your doctor before taking any medicines such as laxatives or stool softeners to help ease constipation. Also ask if you should skip any foods. Drinking lots of fluids and eating foods such as fruits and vegetables that are high in fiber can also help. Remember, do not take laxatives unless your surgeon has prescribed them.  · Drinking alcohol and taking pain medicine can cause dizziness and slow your breathing. It can even be deadly. Do not drink alcohol while taking pain medicine.  · Pain medicine can make you react more slowly to things. Do not drive or run machinery while taking pain medicine.  Your health care provider may tell you to  take acetaminophen to help ease your pain. Ask him or her how much you are supposed to take each day. Acetaminophen or other pain relievers may interact with your prescription medicines or other over-the-counter (OTC) drugs. Some prescription medicines have acetaminophen and other ingredients. Using both prescription and OTC acetaminophen for pain can cause you to overdose. Read the labels on your OTC medicines with care. This will help you to clearly know the list of ingredients, how much to take, and any warnings. It may also help you not take too much acetaminophen. If you have questions or do not understand the information, ask your pharmacist or health care provider to explain it to you before you take the OTC medicine.  Managing nausea  Some people have an upset stomach after surgery. This is often because of anesthesia, pain, or pain medicine, or the stress of surgery. These tips will help you handle nausea and eat healthy foods as you get better. If you were on a special food plan before surgery, ask your doctor if you should follow it while you get better. These tips may help:  · Do not push yourself to eat. Your body will tell you when to eat and how much.  · Start off with clear liquids and soup. They are easier to digest.  · Next try semi-solid foods, such as mashed potatoes, applesauce, and gelatin, as you feel ready.  · Slowly move to solid foods. Dont eat fatty, rich, or spicy foods at first.  · Do not force yourself to have 3 large meals a day. Instead eat smaller amounts more often.  · Take pain medicines with a small amount of solid food, such as crackers or toast, to avoid nausea.     Call your surgeon if  · You still have pain an hour after taking medicine. The medicine may not be strong enough.  · You feel too sleepy, dizzy, or groggy. The medicine may be too strong.  · You have side effects like nausea, vomiting, or skin changes, such as rash, itching, or hives.       If you have obstructive  sleep apnea  You were given anesthesia medicine during surgery to keep you comfortable and free of pain. After surgery, you may have more apnea spells because of this medicine and other medicines you were given. The spells may last longer than usual.   At home:  · Keep using the continuous positive airway pressure (CPAP) device when you sleep. Unless your health care provider tells you not to, use it when you sleep, day or night. CPAP is a common device used to treat obstructive sleep apnea.  · Talk with your provider before taking any pain medicine, muscle relaxants, or sedatives. Your provider will tell you about the possible dangers of taking these medicines.  Date Last Reviewed: 10/16/2014  © 8235-9787 [employer]. [address] [place] [address], [place] [address]. All rights reserved. This information is not intended as a substitute for professional medical care. Always follow your healthcare professional's instructions.

## 2017-09-07 NOTE — PLAN OF CARE
Patient sitting up on side of the bed dressed. AAOX3. VSS. C/o pain for which I medicated him with PRN pain medication. Patient urinated. PT cleared patient for discharge. Instructions given to the the patients daughter who speaks fluent english and Irish and acted as an  for the patient. Patient verbalized understanding per his daughter. Patient to be dicscharged via wheelchair to the car with daughter,

## 2017-09-07 NOTE — H&P
[facility]  Neurosurgery  History & Physical    Patient Name: Carmella Galo  MRN: [id number]  Admission Date: 9/7/2017  Attending Physician: Praveen Raphael MD   Primary Care Provider: Maria Del Rosario Thrasher NP    Patient information was obtained from patient, relative(s) and past medical records.     Subjective:     Chief Complaint/Reason for Admission: surgery     History of Present Illness: Carmella Galo is a 61 y.o. male who has been experiencing worsening thoracic back pain and left leg pain for many years. The back pain is constant and severe. Back pain is worse than leg pain. Leg pain is triggered by walking and radiates into the L5 and S1 distribution of the left leg. He cannot walk long distances because of the pain. He has tried injections and PT with some pain relief. Dr. Raphael recommended L4-5 laminectomy. Risks and benefits explained. Patient wished to proceed.     PTA Medications   Medication Sig    lisinopril-hydrochlorothiazide (PRINZIDE,ZESTORETIC) 10-12.5 mg per tablet Take 1 tablet by mouth once daily.    atorvastatin (LIPITOR) 20 MG tablet Take 20 mg by mouth nightly.    hydrocodone-acetaminophen 5-325mg (NORCO) 5-325 mg per tablet Take 1 tablet by mouth every 4 (four) hours as needed for Pain.    metformin (GLUCOPHAGE) 500 MG tablet Take 500 mg by mouth once daily.       Review of patient's allergies indicates:  No Known Allergies    Past Medical History:   Diagnosis Date    Allergy     Chronic pain     Hyperlipidemia     Hypertension     Type 2 diabetes mellitus, controlled      Past Surgical History:   Procedure Laterality Date    CHOLECYSTECTOMY  2015     Family History     Problem Relation (Age of Onset)    No Known Problems Mother, Father        Social History Main Topics    Smoking status: Former Smoker     Packs/day: 0.50     Types: Cigarettes     Quit date: 12/2016    Smokeless tobacco: Never Used    Alcohol use No    Drug use: No    Sexual activity: No      Review of Systems   Constitutional: Negative for diaphoresis, fever and weight loss.   Respiratory: Negative for shortness of breath.    Cardiovascular: Negative for chest pain.   Gastrointestinal: Negative for blood in stool.   Genitourinary: Negative for hematuria.   Endo/Heme/Allergies: Does not bruise/bleed easily.   All other systems reviewed and are negative.  Objective:     Weight: 93 kg (205 lb)  Body mass index is 32.11 kg/m².  Vital Signs (Most Recent):  Temp: 99 °F (37.2 °C) (09/07/17 0818)  Pulse: 94 (09/07/17 0818)  Resp: 16 (09/07/17 0818)  BP: 137/83 (09/07/17 0818) Vital Signs (24h Range):  Temp:  [99 °F (37.2 °C)] 99 °F (37.2 °C)  Pulse:  [94] 94  Resp:  [16] 16  BP: (137)/(83) 137/83        Neurosurgery Physical Exam   Constitutional: He appears well-developed and well-nourished.      Eyes: Pupils are equal, round, and reactive to light. Conjunctivae and EOM are normal.      Cardiovascular: Normal distal pulses and no edema.      Abdominal: Soft.      Skin: Skin displays no rash on trunk and no rash on extremities. Skin displays no lesions on trunk and no lesions on extremities.     Psych/Behavior: He is alert. He is oriented to person, place, and time. He has a normal mood and affect.     Musculoskeletal:        Neck: Range of motion is full.     Neurological:        DTRs: Tricep reflexes are 2+ on the right side and 2+ on the left side. Bicep reflexes are 2+ on the right side and 2+ on the left side. Brachioradialis reflexes are 2+ on the right side and 2+ on the left side. Patellar reflexes are 3+ on the right side and 2+ on the left side. Achilles reflexes are 3+ on the right side and 2+ on the left side.         Back Exam      Tenderness   The patient is experiencing no tenderness.      Range of Motion   Extension: normal   Flexion: normal   Lateral Bend Right: normal   Lateral Bend Left: normal   Rotation Right: normal   Rotation Left: normal      Muscle Strength   Right Quadriceps:  5/5    Left Quadriceps:  5/5   Right Hamstrings:  5/5   Left Hamstrings:  5/5      Tests   Straight leg raise right: negative  Straight leg raise left: negative     Other   Toe Walk: normal  Heel Walk: normal                 Neurologic Exam      Mental Status   Oriented to person, place, and time.   Speech: speech is normal   Level of consciousness: alert     Cranial Nerves   Cranial nerves II through XII intact.      CN III, IV, VI   Pupils are equal, round, and reactive to light.  Extraocular motions are normal.      Motor Exam   Muscle bulk: normal  Overall muscle tone: normal     Strength   Right deltoid: 5/5  Left deltoid: 5/5  Right biceps: 5/5  Left biceps: 5/5  Right triceps: 5/5  Left triceps: 5/5  Right wrist flexion: 5/5  Left wrist flexion: 5/5  Right wrist extension: 5/5  Left wrist extension: 5/5  Right interossei: 5/5  Left interossei: 4/5  Right iliopsoas: 5/5  Left iliopsoas: 5/5  Right quadriceps: 5/5  Left quadriceps: 5/5  Right hamstrin/5  Left hamstrin/5  Right anterior tibial: 5/5  Left anterior tibial: 5/5  Right posterior tibial: 5/5  Left posterior tibial: 5/5  Right peroneal: 5/5  Left peroneal: 5/5  Right gastroc: 5/5  Left gastroc: 5/5     Sensory Exam   Light touch normal.   Pinprick normal.      Gait, Coordination, and Reflexes      Gait  Gait: normal     Coordination   Finger to nose coordination: normal  Tandem walking coordination: abnormal     Reflexes   Right brachioradialis: 2+  Left brachioradialis: 2+  Right biceps: 2+  Left biceps: 2+  Right triceps: 2+  Left triceps: 2+  Right patellar: 3+  Left patellar: 2+  Right achilles: 3+  Left achilles: 2+  Right plantar: normal  Left plantar: normal  Right Stewart: present  Left Stewart: present  Right ankle clonus: absent  Left ankle clonus: absent    Significant Labs:  No results for input(s): GLU, NA, K, CL, CO2, BUN, CREATININE, CALCIUM, MG in the last 48 hours.  No results for input(s): WBC, HGB, HCT, PLT in the last 48  hours.  No results for input(s): LABPT, INR, APTT in the last 48 hours.  Microbiology Results (last 7 days)     ** No results found for the last 168 hours. **          Significant Diagnostics:  Thoracic spine MRI 06/02/2017: Facet arthropathy at T9-10, no spinal stenosis  Lumbar spine MRI 05/12/2017L L4-5 severe spinal stenosis    Assessment/Plan:     Active Diagnoses:    Diagnosis Date Noted POA    Lumbar stenosis with neurogenic claudication [M48.06] 09/07/2017 Yes      Problems Resolved During this Admission:    Diagnosis Date Noted Date Resolved POA     Dr. Raphael explained the natural history of the disease and all treatment options. He recommended a L4-5 laminectomy, medial facetectomy to improve his neurogenic claudication symptoms.   They have discussed the risks of surgery including bleeding, infection, failure of surgery, CSF leak, nerve root injury, spinal cord injury, weakness, paralysis, peripheral neuropathy, need for reoperation. Patient understands the risks and would like to proceed with surgery.    Sabine Badillo PA-C  Neurosurgery  [facility]

## 2017-09-22 ENCOUNTER — OFFICE VISIT (OUTPATIENT)
Dept: NEUROSURGERY | Facility: CLINIC | Age: 62
End: 2017-09-22
Payer: MEDICAID

## 2017-09-22 VITALS
SYSTOLIC BLOOD PRESSURE: 120 MMHG | WEIGHT: 202.81 LBS | HEART RATE: 97 BPM | BODY MASS INDEX: 31.76 KG/M2 | DIASTOLIC BLOOD PRESSURE: 86 MMHG

## 2017-09-22 DIAGNOSIS — Z98.890 S/P LUMBAR LAMINECTOMY: Primary | ICD-10-CM

## 2017-09-22 PROCEDURE — 99999 PR PBB SHADOW E&M-EST. PATIENT-LVL III: CPT | Mod: PBBFAC,,, | Performed by: PHYSICIAN ASSISTANT

## 2017-09-22 PROCEDURE — 99024 POSTOP FOLLOW-UP VISIT: CPT | Mod: ,,, | Performed by: PHYSICIAN ASSISTANT

## 2017-09-22 PROCEDURE — 99213 OFFICE O/P EST LOW 20 MIN: CPT | Mod: PBBFAC,PO | Performed by: PHYSICIAN ASSISTANT

## 2017-09-22 RX ORDER — CELECOXIB 200 MG/1
200 CAPSULE ORAL 2 TIMES DAILY
COMMUNITY
End: 2017-09-22 | Stop reason: SDUPTHER

## 2017-09-22 RX ORDER — TRAMADOL HYDROCHLORIDE 50 MG/1
TABLET ORAL
Qty: 60 TABLET | Refills: 0 | Status: SHIPPED | OUTPATIENT
Start: 2017-09-22 | End: 2017-11-28 | Stop reason: SDUPTHER

## 2017-09-22 RX ORDER — CYCLOBENZAPRINE HCL 10 MG
10 TABLET ORAL 3 TIMES DAILY PRN
COMMUNITY
End: 2017-09-22 | Stop reason: SDUPTHER

## 2017-09-22 RX ORDER — CELECOXIB 200 MG/1
200 CAPSULE ORAL 2 TIMES DAILY
Qty: 60 CAPSULE | Refills: 0 | Status: SHIPPED | OUTPATIENT
Start: 2017-09-22 | End: 2017-10-23 | Stop reason: SDUPTHER

## 2017-09-22 RX ORDER — CYCLOBENZAPRINE HCL 10 MG
10 TABLET ORAL 3 TIMES DAILY PRN
Qty: 60 TABLET | Refills: 0 | Status: SHIPPED | OUTPATIENT
Start: 2017-09-22 | End: 2017-10-23 | Stop reason: SDUPTHER

## 2017-09-22 NOTE — PROGRESS NOTES
[facility] - Neurosurgery  Progress Note      SUBJECTIVE:     Chief Complaint/Reason for Visit: 2 week post op follow up     History of Present Illness:  Carmella Galo is a 62 y.o. male who is 2 weeks status post L4-5 laminectomy for treatment of back and left leg pain. Patient states he is doing well since surgery. Reports significant improvement in his pain, about 80%. He does have mild low back pain. Overall, happy with the outcome.       Review of patient's allergies indicates:  No Known Allergies      OBJECTIVE:     Vital Signs (Most Recent):  Pulse: 97 (09/22/17 1107)  BP: 120/86 (09/22/17 1107)    Physical Exam:  General: well developed, well nourished, no distress  Neurologic: Alert and oriented. Thought content appropriate.   GCS: Motor: 6/Verbal: 5/Eyes: 4 GCS Total: 15   Mental Status: Awake, Alert, Oriented x3   Cranial nerves: face symmetric, tongue midline, pupils equal, round, reactive to light, EOMI.   Motor Strength: moves all extremities with good strength and tone   Sensation: response to light touch throughout  No gait disturbances   Incision is clean, dry and intact with no signs of erythema, swelling or purulent drainage. Dissolvable sutures are intact. All skin edges are completely approximated.       Diagnostic Results:  N/A    ASSESSMENT/PLAN:     62 y.o. male 2 weeks s/p L4-5 laminectomy with improvement in back and leg pain.     - Follow up in 4 weeks with Dr. Raphael  - Refill flexeril, celebrex, tramadol   - Keep incision open to air.  - Can shower and get incision wet, just pat dry and no vigorous scrubbing. Do not submerge incision for another 6 weeks.   - No lifting more than 15 lbs or excessive bending/twisting.   - Encouraged patient to call if they have any questions or concerns prior to next follow up appt.      Sabine Badillo PA-C  Neurosurgery

## 2017-10-23 ENCOUNTER — OFFICE VISIT (OUTPATIENT)
Dept: NEUROSURGERY | Facility: CLINIC | Age: 62
End: 2017-10-23
Payer: MEDICAID

## 2017-10-23 VITALS
HEART RATE: 110 BPM | BODY MASS INDEX: 32.89 KG/M2 | DIASTOLIC BLOOD PRESSURE: 81 MMHG | SYSTOLIC BLOOD PRESSURE: 122 MMHG | WEIGHT: 210 LBS

## 2017-10-23 DIAGNOSIS — Z98.890 S/P LUMBAR LAMINECTOMY: Primary | ICD-10-CM

## 2017-10-23 PROCEDURE — 99999 PR PBB SHADOW E&M-EST. PATIENT-LVL III: CPT | Mod: PBBFAC,,, | Performed by: PHYSICIAN ASSISTANT

## 2017-10-23 PROCEDURE — 99024 POSTOP FOLLOW-UP VISIT: CPT | Mod: ,,, | Performed by: PHYSICIAN ASSISTANT

## 2017-10-23 PROCEDURE — 99213 OFFICE O/P EST LOW 20 MIN: CPT | Mod: PBBFAC,PO | Performed by: PHYSICIAN ASSISTANT

## 2017-10-23 RX ORDER — CELECOXIB 200 MG/1
200 CAPSULE ORAL 2 TIMES DAILY
Qty: 60 CAPSULE | Refills: 0 | Status: SHIPPED | OUTPATIENT
Start: 2017-10-23 | End: 2017-11-28 | Stop reason: SDUPTHER

## 2017-10-23 RX ORDER — CYCLOBENZAPRINE HCL 10 MG
10 TABLET ORAL 3 TIMES DAILY PRN
Qty: 60 TABLET | Refills: 0 | Status: SHIPPED | OUTPATIENT
Start: 2017-10-23 | End: 2019-06-18

## 2017-10-23 NOTE — PROGRESS NOTES
[facility] - Neurosurgery  Progress Note      SUBJECTIVE:     Chief Complaint/Reason for Visit: surgery follow up     History of Present Illness:  Carmella Galo is a 62 y.o. male who is 6 weeks status post L4-5 laminectomy for treatment of back and left leg pain.  At his last appointment, he reported 80% relief of pain. Today, he reports he is doing well. He denies any pain. He reports some pain on cold days.     Low Back Pain Scale  R Low Back-Pain Score: 2  R Low Back-Pain Intensity: I can tolerate the pain I have without having to use pain killers  R Low Back-Pain Score: I can look after myself normally without causing extra pain  Low Back-Lifting: I can lift heavy weights without extra pain   Low Back-Walking: Pain does not prevent me from walking any distance   Low Back-Sitting: I can sit in any chair as long as I like   Low Back-Standing: I can stand as long as I want without pain   Low Back-Sleeping: I have no pain in bed   Low Back-Social Life: My social life is normal and give me no pain   Low Back-Traveling: I have no pain when traveling   Low Back-Changing Degree of Pain: My pain is rapidly getting better         Review of patient's allergies indicates:  No Known Allergies    Past Medical History:   Diagnosis Date    Allergy     Chronic pain     Hyperlipidemia     Hypertension     Type 2 diabetes mellitus, controlled      Past Surgical History:   Procedure Laterality Date    CHOLECYSTECTOMY  2015     Family History   Problem Relation Age of Onset    No Known Problems Mother     No Known Problems Father      Social History   Substance Use Topics    Smoking status: Former Smoker     Packs/day: 0.50     Types: Cigarettes     Quit date: 12/2016    Smokeless tobacco: Never Used    Alcohol use No        OBJECTIVE:     Vital Signs (Most Recent):  Pulse: 110 (10/23/17 1623)  BP: 122/81 (10/23/17 1623)    Physical Exam:  General: well developed, well nourished, no distress.   Neurologic: Alert and oriented.  Thought content appropriate. GCS 15  Head: normocephalic, atraumatic  Eyes: EOMI.  Neck: trachea midline, no JVD   Cardiovascular: no LE edema  Pulmonary: normal respirations, no signs of respiratory distress  Sensory: intact to light touch throughout  Skin: Skin is warm, dry and intact.  Motor Strength: Moves all extremities spontaneously with good tone.  Full strength upper and lower extremities. No abnormal movements seen.         Diagnostic Results:  N/A    ASSESSMENT/PLAN:     62 y.o. male 6 weeks s/p L4-5 laminectomy with improvement in back and leg pain.     -Follow up in 6 weeks  -Refill celebrex and flexeril   -Encouraged patient to call if they have any questions or concerns prior to next follow up appt.    Sabine Badillo PA-C  Neurosurgery

## 2017-11-28 ENCOUNTER — OFFICE VISIT (OUTPATIENT)
Dept: NEUROSURGERY | Facility: CLINIC | Age: 62
End: 2017-11-28
Payer: MEDICAID

## 2017-11-28 VITALS — HEIGHT: 67 IN | WEIGHT: 210 LBS | BODY MASS INDEX: 32.96 KG/M2

## 2017-11-28 DIAGNOSIS — Z98.890 S/P LAMINECTOMY: Primary | ICD-10-CM

## 2017-11-28 PROCEDURE — 99213 OFFICE O/P EST LOW 20 MIN: CPT | Mod: PBBFAC,PO | Performed by: NEUROLOGICAL SURGERY

## 2017-11-28 PROCEDURE — 99999 PR PBB SHADOW E&M-EST. PATIENT-LVL III: CPT | Mod: PBBFAC,,, | Performed by: NEUROLOGICAL SURGERY

## 2017-11-28 PROCEDURE — 99024 POSTOP FOLLOW-UP VISIT: CPT | Mod: ,,, | Performed by: NEUROLOGICAL SURGERY

## 2017-11-28 RX ORDER — TRAMADOL HYDROCHLORIDE 50 MG/1
TABLET ORAL
Qty: 60 TABLET | Refills: 0 | Status: SHIPPED | OUTPATIENT
Start: 2017-11-28 | End: 2019-06-18 | Stop reason: SDUPTHER

## 2017-11-28 RX ORDER — CELECOXIB 200 MG/1
200 CAPSULE ORAL 2 TIMES DAILY
Qty: 60 CAPSULE | Refills: 0 | Status: SHIPPED | OUTPATIENT
Start: 2017-11-28 | End: 2019-06-18

## 2017-11-28 NOTE — PROGRESS NOTES
NEUROSURGICAL POST-OPERATIVE PROGRESS NOTE    DATE OF SERVICE:  11/28/2017      ATTENDING PHYSICIAN:  Praveen Raphael MD    SUBJECTIVE:    INTERIM HISTORY:    This is a very pleasant 62 y.o. y.o. male, who is status 6 weeks L4-5 laminectomy. Really happy with his pain relief. Only has occasional pain when it is cold outside. Has occasional UE numbness when he sleeps on his side.                OBJECTIVE:    PHYSICAL EXAMINATION:   There were no vitals filed for this visit.    Neurosurgery Physical Exam    Ortho Exam    Neurologic Exam    Wound has healed.    DIAGNOSTIC DATA:    na    ASSESMENT:    This is a 62 y.o. male who is s/p L4-5 laminectomy with resolution of his neurogenic claudication    PLAN:    Refill tramadol 60 pills for back pain  Refill celebrex   Follow-up prn        Praveen Raphael MD  Pager: [phone]

## 2018-11-16 PROBLEM — Z12.11 SCREENING FOR COLON CANCER: Status: ACTIVE | Noted: 2018-11-16

## 2019-01-02 ENCOUNTER — TELEPHONE (OUTPATIENT)
Dept: PAIN MEDICINE | Facility: CLINIC | Age: 64
End: 2019-01-02

## 2019-01-02 NOTE — TELEPHONE ENCOUNTER
----- Message from Praveen Raphael MD sent at 1/2/2019  2:13 PM CST -----  Contact: [phone]/pt's nephew       ----- Message -----  From: Noelle Linares  Sent: 1/2/2019  12:21 PM  To: [facility] Staff    .Patient would like to be seen sooner than the next available appointment. Please advise.

## 2021-03-30 NOTE — PLAN OF CARE
Message left on machine for Catalina at Dr Thrasher's office to fax clearance letter.  Labs received earlier this am.   No

## 2022-02-05 ENCOUNTER — HOSPITAL ENCOUNTER (EMERGENCY)
Facility: HOSPITAL | Age: 67
Discharge: HOME | End: 2022-02-05
Attending: EMERGENCY MEDICINE
Payer: MEDICARE

## 2022-02-05 VITALS
DIASTOLIC BLOOD PRESSURE: 84 MMHG | SYSTOLIC BLOOD PRESSURE: 179 MMHG | WEIGHT: 216.38 LBS | OXYGEN SATURATION: 97 % | HEART RATE: 100 BPM | TEMPERATURE: 98 F | BODY MASS INDEX: 36.94 KG/M2 | HEIGHT: 64 IN | RESPIRATION RATE: 16 BRPM

## 2022-02-05 DIAGNOSIS — M54.9 BACK PAIN: ICD-10-CM

## 2022-02-05 DIAGNOSIS — M54.9 MID BACK PAIN: Primary | ICD-10-CM

## 2022-02-05 DIAGNOSIS — I10 HYPERTENSION, UNSPECIFIED TYPE: ICD-10-CM

## 2022-02-05 PROCEDURE — 25000003 PHARM REV CODE 250: Performed by: EMERGENCY MEDICINE

## 2022-02-05 PROCEDURE — 99284 EMERGENCY DEPT VISIT MOD MDM: CPT | Mod: 25

## 2022-02-05 PROCEDURE — 96372 THER/PROPH/DIAG INJ SC/IM: CPT

## 2022-02-05 PROCEDURE — 63600175 PHARM REV CODE 636 W HCPCS: Performed by: EMERGENCY MEDICINE

## 2022-02-05 RX ORDER — LIDOCAINE 50 MG/G
1 PATCH TOPICAL
Status: DISCONTINUED | OUTPATIENT
Start: 2022-02-05 | End: 2022-02-05 | Stop reason: HOSPADM

## 2022-02-05 RX ORDER — CYCLOBENZAPRINE HCL 10 MG
10 TABLET ORAL
Status: COMPLETED | OUTPATIENT
Start: 2022-02-05 | End: 2022-02-05

## 2022-02-05 RX ORDER — CYCLOBENZAPRINE HCL 5 MG
5 TABLET ORAL 3 TIMES DAILY PRN
Qty: 10 TABLET | Refills: 0 | Status: SHIPPED | OUTPATIENT
Start: 2022-02-05 | End: 2022-02-10

## 2022-02-05 RX ORDER — KETOROLAC TROMETHAMINE 30 MG/ML
15 INJECTION, SOLUTION INTRAMUSCULAR; INTRAVENOUS
Status: COMPLETED | OUTPATIENT
Start: 2022-02-05 | End: 2022-02-05

## 2022-02-05 RX ORDER — LIDOCAINE 50 MG/G
1 PATCH TOPICAL DAILY PRN
Qty: 10 PATCH | Refills: 0 | Status: SHIPPED | OUTPATIENT
Start: 2022-02-05 | End: 2022-02-15

## 2022-02-05 RX ORDER — LIDOCAINE HYDROCHLORIDE 10 MG/ML
INJECTION INFILTRATION; PERINEURAL
Status: DISCONTINUED
Start: 2022-02-05 | End: 2022-02-05 | Stop reason: HOSPADM

## 2022-02-05 RX ORDER — NAPROXEN 500 MG/1
500 TABLET ORAL EVERY 12 HOURS PRN
Qty: 20 TABLET | Refills: 0 | Status: SHIPPED | OUTPATIENT
Start: 2022-02-05 | End: 2022-11-21

## 2022-02-05 RX ADMIN — LIDOCAINE 1 PATCH: 50 PATCH TOPICAL at 03:02

## 2022-02-05 RX ADMIN — KETOROLAC TROMETHAMINE 15 MG: 30 INJECTION, SOLUTION INTRAMUSCULAR at 03:02

## 2022-02-05 RX ADMIN — CYCLOBENZAPRINE HYDROCHLORIDE 10 MG: 10 TABLET, FILM COATED ORAL at 03:02

## 2022-02-05 NOTE — ED PROVIDER NOTES
EMERGENCY DEPARTMENT HISTORY AND PHYSICAL EXAM          Date: 2/5/2022   Patient Name: Carmella Galo       History of Presenting Illness           Chief Complaint   Patient presents with    Back Pain     Pt reports mid-back pain onset 4 days ago.  Pt states it hurts and is keeping him up.         History Provided By: Patient    Melvin Galo is a 66 y.o. male with PMHX of hypertension, hyperlipidemia, diabetes, chronic back pain who presents to the emergency department C/O back pain.    Patient reports midthoracic back pain for the past 4 days.  He comes this morning because he he cannot sleep comfortably.  Patient says pain is bilateral mid back without radiation.  No weakness, no lower extremity weakness, no changes in some station, no bladder bowel issues.      PCP: Maria Del Rosario Thrasher NP        Current Facility-Administered Medications   Medication Dose Route Frequency Provider Last Rate Last Admin    LIDOcaine HCL 10 mg/ml (1%) 10 mg/mL (1 %) injection             LIDOcaine 5 % patch 1 patch  1 patch Transdermal ED 1 Time Tony Kebede MD   1 patch at 02/05/22 0320    LIDOcaine 5 % patch 1 patch  1 patch Transdermal ED 1 Time Tony Kebede MD         Current Outpatient Medications   Medication Sig Dispense Refill    atorvastatin (LIPITOR) 20 MG tablet Take 20 mg by mouth nightly.  6    cyclobenzaprine (FLEXERIL) 5 MG tablet Take 1 tablet (5 mg total) by mouth 3 (three) times daily as needed for Muscle spasms. 10 tablet 0    LIDOcaine (LIDODERM) 5 % Place 1 patch onto the skin daily as needed (Pain). Remove & Discard patch within 12 hours or as directed by MD 10 patch 0    lisinopril-hydrochlorothiazide (PRINZIDE,ZESTORETIC) 10-12.5 mg per tablet Take 1 tablet by mouth once daily.  6    metformin (GLUCOPHAGE) 500 MG tablet Take 500 mg by mouth once daily.  6    naproxen (NAPROSYN) 500 MG tablet Take 1 tablet (500 mg total) by mouth every 12 (twelve) hours as needed (Pain). 20 tablet  "0    pantoprazole (PROTONIX) 20 MG tablet Take 1 tablet (20 mg total) by mouth once daily. 30 tablet 0           Past History     Past Medical History:   Past Medical History:   Diagnosis Date    Allergy     Chronic pain     Hyperlipidemia     Hypertension     Type 2 diabetes mellitus, controlled         Past Surgical History:   Past Surgical History:   Procedure Laterality Date    CHOLECYSTECTOMY  2015    COLONOSCOPY N/A 11/16/2018    Procedure: COLONOSCOPY;  Surgeon: Luis Bogran-Reyes, MD;  Location: [facility];  Service: Endoscopy;  Laterality: N/A;        Family History:   Family History   Problem Relation Age of Onset    No Known Problems Mother     No Known Problems Father         Social History:   Social History     Tobacco Use    Smoking status: Light Tobacco Smoker     Packs/day: 0.50     Types: Cigarettes    Smokeless tobacco: Never Used   Substance Use Topics    Alcohol use: No     Comment: Quit 12 years ago    Drug use: No        Allergies:   Review of patient's allergies indicates:  No Known Allergies       Review of Systems   Review of Systems   Constitutional: Negative for chills and fever.   Respiratory: Negative for shortness of breath.    Cardiovascular: Negative for chest pain.   Gastrointestinal: Negative for nausea and vomiting.   Musculoskeletal: Positive for back pain. Negative for myalgias, neck pain and neck stiffness.   Neurological: Negative for dizziness, weakness and numbness.   All other systems reviewed and are negative.               Physical Exam     Vitals:    02/05/22 0305   BP: (!) 179/84   Pulse: 100   Resp: 16   Temp: 97.9 °F (36.6 °C)   TempSrc: Oral   SpO2: 97%   Weight: 98.2 kg (216 lb 6.4 oz)   Height: 5' 4" (1.626 m)      Physical Exam  Vitals and nursing note reviewed.   Constitutional:       General: He is not in acute distress.     Appearance: Normal appearance. He is well-developed. He is not ill-appearing.   HENT:      Head: Normocephalic and atraumatic. "      Nose: No congestion or rhinorrhea.   Eyes:      Conjunctiva/sclera: Conjunctivae normal.      Pupils: Pupils are equal, round, and reactive to light.   Cardiovascular:      Rate and Rhythm: Regular rhythm.   Pulmonary:      Effort: Pulmonary effort is normal. No respiratory distress.   Musculoskeletal:         General: No deformity or signs of injury. Normal range of motion.      Cervical back: Normal, normal range of motion and neck supple.      Thoracic back: Tenderness present. No deformity, spasms or bony tenderness. Normal range of motion.      Lumbar back: Normal. Negative right straight leg raise test and negative left straight leg raise test.        Back:    Skin:     General: Skin is warm and dry.      Coloration: Skin is not pale.      Findings: No lesion.   Neurological:      General: No focal deficit present.      Mental Status: He is alert and oriented to person, place, and time.      Cranial Nerves: No cranial nerve deficit.      Sensory: No sensory deficit.      Motor: No weakness.   Psychiatric:         Mood and Affect: Mood normal.         Behavior: Behavior normal.              Diagnostic Study Results      Labs -   No results found for this or any previous visit (from the past 12 hour(s)).     Radiologic Studies -    X-Ray Chest 1 View    (Results Pending)        Medications given in the ED-   Medications   LIDOcaine 5 % patch 1 patch (1 patch Transdermal Patch Applied 2/5/22 0320)   LIDOcaine HCL 10 mg/ml (1%) 10 mg/mL (1 %) injection (  Canceled Entry 2/5/22 0345)   LIDOcaine 5 % patch 1 patch (1 patch Transdermal Not Given 2/5/22 0345)   ketorolac injection 15 mg (15 mg Intramuscular Given 2/5/22 0321)   cyclobenzaprine tablet 10 mg (10 mg Oral Given 2/5/22 0321)           Medical Decision Making    I am the first provider for this patient.     I reviewed the vital signs, available nursing notes, past medical history, past surgical history, family history and social history.     Vital  Signs:  Reviewed the patient's vital signs.     Pulse Oximetry Analysis and Interpretation:    97% on Room Air, normal        Records Reviewed: Old medical records.  Nursing notes.        Provider Notes (Medical Decision Making): Carmella Galo is a 66 y.o. male here for low back pain.  Review prior documentation and patient has been seen before for this.  Patient reproducible midthoracic back pain.  No neuro symptoms.  No red flag symptoms for back pain.  Patient advised trauma or injury.  Will get chest x-ray to evaluate for any abnormal cardiac silhouette however patient is not in acute distress and not having chest pain lowsuspicion for aortic dissection and if normal will treat for musculoskeletal pain      Procedures:   Procedures      ED Course:        CXR  Interpretation: (Preliminary)   CXR read by Dr. Tony Kebede at 0346    Normal cardiac silhouette, no acute findings, no consolidation, no bony abnormality              Diagnosis and Disposition     Critical Care:      DISCHARGE NOTE:       Carmella Galo's  results have been reviewed with him.  He has been counseled regarding his diagnosis, treatment, and plan.  He verbally conveys understanding and agreement of the signs, symptoms, diagnosis, treatment and prognosis and additionally agrees to follow up as discussed.  He also agrees with the care-plan and conveys that all of his questions have been answered.  I have also provided discharge instructions for him that include: educational information regarding their diagnosis and treatment, and list of reasons why they would want to return to the ED prior to their follow-up appointment, should his condition change. He has been provided with education for proper emergency department utilization.         CLINICAL IMPRESSION:        1. Mid back pain    2. Back pain    3. Hypertension, unspecified type              PLAN:   1. Discharge Home  2.      Medication List      START taking these medications     cyclobenzaprine 5 MG tablet  Commonly known as: FLEXERIL  Take 1 tablet (5 mg total) by mouth 3 (three) times daily as needed for Muscle spasms.     LIDOcaine 5 %  Commonly known as: LIDODERM  Place 1 patch onto the skin daily as needed (Pain). Remove & Discard patch within 12 hours or as directed by MD     naproxen 500 MG tablet  Commonly known as: NAPROSYN  Take 1 tablet (500 mg total) by mouth every 12 (twelve) hours as needed (Pain).        ASK your doctor about these medications    atorvastatin 20 MG tablet  Commonly known as: LIPITOR     lisinopriL-hydrochlorothiazide 10-12.5 mg per tablet  Commonly known as: PRINZIDE,ZESTORETIC     metFORMIN 500 MG tablet  Commonly known as: GLUCOPHAGE     pantoprazole 20 MG tablet  Commonly known as: PROTONIX  Take 1 tablet (20 mg total) by mouth once daily.           Where to Get Your Medications      These medications were sent to [address] [address]    Phone: [phone]   · cyclobenzaprine 5 MG tablet  · LIDOcaine 5 %  · naproxen 500 MG tablet        3. Maria Del Rosario Thrasher, JAN  [address] [facility] [address] [phone]    Schedule an appointment as soon as possible for a visit   Primary care follow up       _______________________________     Please note that this dictation was completed with [employer], the computer voice recognition software.  Quite often unanticipated grammatical, syntax, homophones, and other interpretive errors are inadvertently transcribed by the computer software.  Please disregard these errors.  Please excuse any errors that have escaped final proofreading.             Tony Kebede MD  02/05/22 0924

## 2022-12-20 DIAGNOSIS — R74.8 ELEVATED LIVER ENZYMES: Primary | ICD-10-CM

## 2022-12-27 ENCOUNTER — HOSPITAL ENCOUNTER (OUTPATIENT)
Dept: RADIOLOGY | Facility: HOSPITAL | Age: 67
Discharge: HOME | End: 2022-12-27
Attending: NURSE PRACTITIONER
Payer: MEDICARE

## 2022-12-27 DIAGNOSIS — R74.8 ELEVATED LIVER ENZYMES: ICD-10-CM

## 2022-12-27 PROCEDURE — 76705 ECHO EXAM OF ABDOMEN: CPT | Mod: TC

## 2023-01-27 DIAGNOSIS — Z13.6 SCREENING FOR AAA (ABDOMINAL AORTIC ANEURYSM): Primary | ICD-10-CM

## 2023-02-02 DIAGNOSIS — Z13.6 SCREENING FOR AAA (ABDOMINAL AORTIC ANEURYSM): Primary | ICD-10-CM

## 2023-03-07 ENCOUNTER — HOSPITAL ENCOUNTER (OUTPATIENT)
Dept: RADIOLOGY | Facility: HOSPITAL | Age: 68
Discharge: HOME | End: 2023-03-07
Attending: NURSE PRACTITIONER
Payer: MEDICARE

## 2023-03-07 DIAGNOSIS — Z13.6 SCREENING FOR AAA (ABDOMINAL AORTIC ANEURYSM): ICD-10-CM

## 2023-03-07 PROCEDURE — 76775 US EXAM ABDO BACK WALL LIM: CPT | Mod: TC

## 2024-01-22 DIAGNOSIS — R74.01 ELEVATED ALANINE AMINOTRANSFERASE (ALT) LEVEL: Primary | ICD-10-CM

## 2024-02-16 ENCOUNTER — HOSPITAL ENCOUNTER (OUTPATIENT)
Dept: RADIOLOGY | Facility: HOSPITAL | Age: 69
Discharge: HOME | End: 2024-02-16
Attending: NURSE PRACTITIONER
Payer: MEDICARE

## 2024-02-16 DIAGNOSIS — R74.01 ELEVATED ALANINE AMINOTRANSFERASE (ALT) LEVEL: ICD-10-CM

## 2024-02-16 PROCEDURE — 76705 ECHO EXAM OF ABDOMEN: CPT | Mod: TC

## 2024-04-01 ENCOUNTER — HOSPITAL ENCOUNTER (OUTPATIENT)
Dept: RADIOLOGY | Facility: HOSPITAL | Age: 69
Discharge: HOME | End: 2024-04-01
Attending: NURSE PRACTITIONER
Payer: MEDICARE

## 2024-04-01 DIAGNOSIS — M25.512 LEFT SHOULDER PAIN: ICD-10-CM

## 2024-04-01 DIAGNOSIS — M25.512 LEFT SHOULDER PAIN: Primary | ICD-10-CM

## 2024-04-01 PROCEDURE — 73030 X-RAY EXAM OF SHOULDER: CPT | Mod: TC,LT

## 2024-05-13 ENCOUNTER — LAB VISIT (OUTPATIENT)
Dept: LAB | Facility: HOSPITAL | Age: 69
End: 2024-05-13
Payer: MEDICARE

## 2024-05-13 DIAGNOSIS — A04.8 INTESTINAL INFECTION DUE TO AEROBACTER AEROGENES: Primary | ICD-10-CM

## 2024-05-13 PROCEDURE — 87338 HPYLORI STOOL AG IA: CPT

## 2024-05-24 LAB — H PYLORI AG STL QL IA: NOT DETECTED

## 2024-06-11 DIAGNOSIS — F17.210 NICOTINE DEPENDENCE, CIGARETTES, UNCOMPLICATED: Primary | ICD-10-CM

## 2024-06-26 ENCOUNTER — HOSPITAL ENCOUNTER (OUTPATIENT)
Dept: RADIOLOGY | Facility: HOSPITAL | Age: 69
Discharge: HOME | End: 2024-06-26
Attending: NURSE PRACTITIONER
Payer: MEDICARE

## 2024-06-26 DIAGNOSIS — F17.210 NICOTINE DEPENDENCE, CIGARETTES, UNCOMPLICATED: ICD-10-CM

## 2024-06-26 PROCEDURE — 71271 CT THORAX LUNG CANCER SCR C-: CPT | Mod: TC

## 2024-11-21 DIAGNOSIS — J84.111 IDIOPATHIC INTERSTITIAL PNEUMONIA, NOT OTHERWISE SPECIFIED: Primary | ICD-10-CM

## 2024-11-25 ENCOUNTER — HOSPITAL ENCOUNTER (OUTPATIENT)
Dept: RADIOLOGY | Facility: HOSPITAL | Age: 69
Discharge: HOME | End: 2024-11-25
Attending: INTERNAL MEDICINE
Payer: MEDICARE

## 2024-11-25 DIAGNOSIS — J84.111 IDIOPATHIC INTERSTITIAL PNEUMONIA, NOT OTHERWISE SPECIFIED: ICD-10-CM

## 2024-11-25 PROCEDURE — 71250 CT THORAX DX C-: CPT | Mod: TC

## 2025-01-09 ENCOUNTER — HOSPITAL ENCOUNTER (OUTPATIENT)
Dept: RADIOLOGY | Facility: HOSPITAL | Age: 70
Discharge: HOME | End: 2025-01-09
Attending: NURSE PRACTITIONER
Payer: MEDICARE

## 2025-01-09 DIAGNOSIS — F17.210 NICOTINE DEPENDENCE, CIGARETTES, UNCOMPLICATED: ICD-10-CM

## 2025-01-09 PROCEDURE — 76706 US ABDL AORTA SCREEN AAA: CPT | Mod: TC

## 2025-04-10 ENCOUNTER — HOSPITAL ENCOUNTER (EMERGENCY)
Facility: HOSPITAL | Age: 70
Discharge: HOME | End: 2025-04-10
Attending: FAMILY MEDICINE
Payer: MEDICARE

## 2025-04-10 VITALS
TEMPERATURE: 98 F | DIASTOLIC BLOOD PRESSURE: 65 MMHG | BODY MASS INDEX: 38.27 KG/M2 | WEIGHT: 224.19 LBS | SYSTOLIC BLOOD PRESSURE: 132 MMHG | HEART RATE: 80 BPM | HEIGHT: 64 IN | RESPIRATION RATE: 20 BRPM | OXYGEN SATURATION: 95 %

## 2025-04-10 DIAGNOSIS — R60.0 BILATERAL LOWER EXTREMITY EDEMA: Primary | ICD-10-CM

## 2025-04-10 DIAGNOSIS — R06.02 SHORTNESS OF BREATH: ICD-10-CM

## 2025-04-10 LAB
ABSOLUTE EOSINOPHIL (OHS): 0.14 K/UL
ABSOLUTE MONOCYTE (OHS): 0.79 K/UL (ref 0.3–1)
ABSOLUTE NEUTROPHIL COUNT (OHS): 4.12 K/UL (ref 1.8–7.7)
ALBUMIN SERPL BCP-MCNC: 3.8 G/DL (ref 3.5–5.2)
ALP SERPL-CCNC: 120 UNIT/L (ref 40–150)
ALT SERPL W/O P-5'-P-CCNC: 48 UNIT/L (ref 10–44)
ANION GAP (OHS): 8 MMOL/L (ref 8–16)
AST SERPL-CCNC: 51 UNIT/L (ref 11–45)
BASOPHILS # BLD AUTO: 0.03 K/UL
BASOPHILS NFR BLD AUTO: 0.4 %
BILIRUB SERPL-MCNC: 0.6 MG/DL (ref 0.1–1)
BUN SERPL-MCNC: 18 MG/DL (ref 8–23)
CALCIUM SERPL-MCNC: 9 MG/DL (ref 8.7–10.5)
CHLORIDE SERPL-SCNC: 103 MMOL/L (ref 95–110)
CO2 SERPL-SCNC: 24 MMOL/L (ref 23–29)
CREAT SERPL-MCNC: 0.8 MG/DL (ref 0.5–1.4)
ERYTHROCYTE [DISTWIDTH] IN BLOOD BY AUTOMATED COUNT: 13.1 % (ref 11.5–14.5)
GFR SERPLBLD CREATININE-BSD FMLA CKD-EPI: >60 ML/MIN/1.73/M2
GLUCOSE SERPL-MCNC: 182 MG/DL (ref 70–110)
HCT VFR BLD AUTO: 40.1 % (ref 40–54)
HGB BLD-MCNC: 13.5 GM/DL (ref 14–18)
IMM GRANULOCYTES # BLD AUTO: 0.02 K/UL (ref 0–0.04)
IMM GRANULOCYTES NFR BLD AUTO: 0.3 % (ref 0–0.5)
LYMPHOCYTES # BLD AUTO: 2.19 K/UL (ref 1–4.8)
MCH RBC QN AUTO: 27.4 PG (ref 27–31)
MCHC RBC AUTO-ENTMCNC: 33.7 G/DL (ref 32–36)
MCV RBC AUTO: 81 FL (ref 82–98)
NT-PROBNP SERPL-MCNC: 18 PG/ML
NUCLEATED RBC (/100WBC) (OHS): 0 /100 WBC
PLATELET # BLD AUTO: 206 K/UL (ref 150–450)
PMV BLD AUTO: 10.6 FL (ref 9.2–12.9)
POTASSIUM SERPL-SCNC: 3.5 MMOL/L (ref 3.5–5.1)
PROT SERPL-MCNC: 7.2 GM/DL (ref 6–8.4)
RBC # BLD AUTO: 4.93 M/UL (ref 4.6–6.2)
RELATIVE EOSINOPHIL (OHS): 1.9 %
RELATIVE LYMPHOCYTE (OHS): 30 % (ref 18–48)
RELATIVE MONOCYTE (OHS): 10.8 % (ref 4–15)
RELATIVE NEUTROPHIL (OHS): 56.6 % (ref 38–73)
SODIUM SERPL-SCNC: 135 MMOL/L (ref 136–145)
TROPONIN I SERPL HS-MCNC: 11 NG/L
TSH SERPL-ACNC: 0.48 UIU/ML (ref 0.4–4)
WBC # BLD AUTO: 7.29 K/UL (ref 3.9–12.7)

## 2025-04-10 PROCEDURE — 93010 ELECTROCARDIOGRAM REPORT: CPT | Mod: ,,, | Performed by: INTERNAL MEDICINE

## 2025-04-10 PROCEDURE — 85025 COMPLETE CBC W/AUTO DIFF WBC: CPT

## 2025-04-10 PROCEDURE — 83880 ASSAY OF NATRIURETIC PEPTIDE: CPT

## 2025-04-10 PROCEDURE — 84484 ASSAY OF TROPONIN QUANT: CPT

## 2025-04-10 PROCEDURE — 99285 EMERGENCY DEPT VISIT HI MDM: CPT | Mod: 25

## 2025-04-10 PROCEDURE — 82040 ASSAY OF SERUM ALBUMIN: CPT

## 2025-04-10 PROCEDURE — 84443 ASSAY THYROID STIM HORMONE: CPT

## 2025-04-10 PROCEDURE — 93005 ELECTROCARDIOGRAM TRACING: CPT

## 2025-04-10 PROCEDURE — 36415 COLL VENOUS BLD VENIPUNCTURE: CPT

## 2025-04-10 RX ORDER — FUROSEMIDE 20 MG/1
20 TABLET ORAL DAILY
Qty: 30 TABLET | Refills: 0 | Status: SHIPPED | OUTPATIENT
Start: 2025-04-10 | End: 2026-04-10

## 2025-04-11 LAB
OHS QRS DURATION: 86 MS
OHS QTC CALCULATION: 421 MS

## 2025-04-11 NOTE — ED PROVIDER NOTES
Encounter Date: 4/10/2025       History     Chief Complaint   Patient presents with    Leg Swelling     Pt reports to ED with bilateral leg swelling and lower back pain that started 2 wks ago. Denies chest pain and SOB.      69-year-old male with history of hyperlipidemia, hypertension, diabetes, chronic back pain presents to ED with complaints of bilateral leg swelling for 2 weeks.  He reports symptoms worse at the end of the day and after he has been standing for awhile.  Additionally he reports some lower back pain.  Denies any new injury or trauma.  Denies any chest pain, shortness a breath, bowel or bladder incontinence, fever, saddle anesthesia.    The history is provided by the patient.     Review of patient's allergies indicates:  No Known Allergies  Past Medical History:   Diagnosis Date    Allergy     Chronic pain     Hyperlipidemia     Hypertension     Type 2 diabetes mellitus, controlled      Past Surgical History:   Procedure Laterality Date    CHOLECYSTECTOMY  2015    COLONOSCOPY N/A 11/16/2018    Procedure: COLONOSCOPY;  Surgeon: Luis Bogran-Reyes, MD;  Location: [facility];  Service: Endoscopy;  Laterality: N/A;     Family History   Problem Relation Name Age of Onset    No Known Problems Mother      No Known Problems Father       Social History[1]  Review of Systems   Constitutional:  Negative for fever.   HENT:  Negative for sore throat.    Eyes: Negative.    Respiratory:  Negative for shortness of breath.    Cardiovascular:  Positive for leg swelling. Negative for chest pain.   Gastrointestinal:  Negative for nausea.   Endocrine: Negative.    Genitourinary:  Negative for dysuria.   Musculoskeletal:  Positive for back pain.   Skin:  Negative for rash.   Allergic/Immunologic: Negative.    Neurological:  Negative for weakness.   Hematological:  Does not bruise/bleed easily.   Psychiatric/Behavioral: Negative.         Physical Exam     Initial Vitals [04/10/25 2011]   BP Pulse Resp Temp SpO2   138/87  104 18 97.9 °F (36.6 °C) 96 %      MAP       --         Physical Exam    Nursing note and vitals reviewed.  Constitutional: He appears well-developed and well-nourished.   HENT:   Head: Normocephalic and atraumatic.   Eyes: EOM are normal.   Neck: Neck supple. No JVD present.   Normal range of motion.  Cardiovascular:  Normal rate and regular rhythm.           Pulmonary/Chest: Breath sounds normal. No respiratory distress.   Abdominal: Abdomen is soft. He exhibits no distension. There is no abdominal tenderness.   Musculoskeletal:         General: Normal range of motion.      Cervical back: Normal range of motion and neck supple. No tenderness or bony tenderness.      Thoracic back: No tenderness or bony tenderness.      Lumbar back: Edema (1+ edema) present. No tenderness or bony tenderness.     Neurological: He is alert and oriented to person, place, and time.   Skin: Skin is warm and dry.   Psychiatric: He has a normal mood and affect. Thought content normal.         ED Course   Procedures  Labs Reviewed   COMPREHENSIVE METABOLIC PANEL - Abnormal       Result Value    Sodium 135 (*)     Potassium 3.5      Chloride 103      CO2 24      Glucose 182 (*)     BUN 18      Creatinine 0.8      Calcium 9.0      Protein Total 7.2      Albumin 3.8      Bilirubin Total 0.6            AST 51 (*)     ALT 48 (*)     Anion Gap 8      eGFR >60     CBC WITH DIFFERENTIAL - Abnormal    WBC 7.29      RBC 4.93      HGB 13.5 (*)     HCT 40.1      MCV 81 (*)     MCH 27.4      MCHC 33.7      RDW 13.1      Platelet Count 206      MPV 10.6      Nucleated RBC 0      Neut % 56.6      Lymph % 30.0      Mono % 10.8      Eos % 1.9      Basophil % 0.4      Imm Grans % 0.3      Neut # 4.12      Lymph # 2.19      Mono # 0.79      Eos # 0.14      Baso # 0.03      Imm Grans # 0.02     TROPONIN I HIGH SENSITIVITY - Normal    Troponin High Sensitive 11     NT-PRO NATRIURETIC PEPTIDE - Normal    NT-proBNP 18     TSH - Normal    TSH 0.479     CBC  W/ AUTO DIFFERENTIAL    Narrative:     The following orders were created for panel order CBC auto differential.  Procedure                               Abnormality         Status                     ---------                               -----------         ------                     CBC with Differential[[id number]]       Abnormal            Final result                 Please view results for these tests on the individual orders.     EKG Readings: (Independently Interpreted)   Initial Reading: No STEMI. Previous EKG: Compared with most recent EKG Previous EKG Date: 4/17/17. Rhythm: Normal Sinus Rhythm. Heart Rate: 85. Ectopy: No Ectopy. Conduction: Normal.       Imaging Results              X-Ray Chest AP Portable (Final result)  Result time 04/10/25 22:53:45      Final result by Norman Torres MD (04/10/25 22:53:45)                   Impression:      No evidence of an acute pulmonary process.      Electronically signed by: Norman Torres MD  Date:    04/10/2025  Time:    22:53               Narrative:    EXAMINATION:  XR CHEST AP PORTABLE    CLINICAL HISTORY:  CHF;    COMPARISON:  02/05/2022    FINDINGS:  Cardiac silhouette does not appear enlarged.  There are low lung volumes.  No definite focal infiltrate or pleural effusion.  No acute osseous finding.                        Wet Read by Say Kamara Jr., MD (04/10/25 20:36:22, [place] - Emergency Department, Emergency Medicine)    No acute abnormality                                     Medications - No data to display  Medical Decision Making  Amount and/or Complexity of Data Reviewed  Labs: ordered.  Radiology: ordered and independent interpretation performed.    Risk  Prescription drug management.                                      Clinical Impression:  Final diagnoses:  [R06.02] Shortness of breath  [R60.0] Bilateral lower extremity edema (Primary)          ED Disposition Condition    Discharge Stable          ED Prescriptions       Medication Sig  Dispense Start Date End Date Auth. Provider    furosemide (LASIX) 20 MG tablet Take 1 tablet (20 mg total) by mouth once daily. 30 tablet 4/10/2025 4/10/2026 Say Kamara Jr., MD          Follow-up Information       Follow up With Specialties Details Why Contact Info    [facility] In 2 days As needed, If symptoms worsen [address] [place] [address]  [phone]      Sabine Ndiaye [facility]   [address] [place] [address]  [phone]                   [1]   Social History  Tobacco Use    Smoking status: Light Smoker     Current packs/day: 0.50     Types: Cigarettes    Smokeless tobacco: Never   Substance Use Topics    Alcohol use: No     Comment: Quit 12 years ago    Drug use: No        Say Kamara Jr., MD  04/11/25 0459

## 2025-05-01 ENCOUNTER — HOSPITAL ENCOUNTER (OUTPATIENT)
Dept: RADIOLOGY | Facility: HOSPITAL | Age: 70
Discharge: HOME | End: 2025-05-01
Attending: NURSE PRACTITIONER
Payer: MEDICARE

## 2025-05-01 DIAGNOSIS — R22.0 LEFT FACIAL SWELLING: ICD-10-CM

## 2025-05-01 PROCEDURE — 70486 CT MAXILLOFACIAL W/O DYE: CPT | Mod: TC

## (undated) DEVICE — SEE MEDLINE ITEM 157117

## (undated) DEVICE — SEE MEDLINE ITEM 146313

## (undated) DEVICE — NDL SPINAL SPINOCAN 22GX3.5

## (undated) DEVICE — SUT MONOCRYL 4-0 PS-2

## (undated) DEVICE — SUT 0 VICRYL / UR6 (J603)

## (undated) DEVICE — ADHESIVE MASTISOL VIAL 48/BX

## (undated) DEVICE — DRESSING TEGADERM 2 3/8 X 2.75

## (undated) DEVICE — UNDERGLOVES BIOGEL PI SZ 6 LF

## (undated) DEVICE — GLOVE SURGICAL LATEX SZ 7

## (undated) DEVICE — SEE MEDLINE ITEM 157116

## (undated) DEVICE — UNDERGLOVES BIOGEL PI SIZE 7.5

## (undated) DEVICE — DRESSING TELFA PAD N ADH 2X3

## (undated) DEVICE — GAUZE SPONGE 4X4 12PLY

## (undated) DEVICE — DRAPE OPMI ZEISS 1/6 MICRO

## (undated) DEVICE — BLADE ELECTRO EDGE INSULATED

## (undated) DEVICE — SEE MEDLINE ITEM 156905

## (undated) DEVICE — DRAPE C-ARM/MOBILE XRAY 44X80

## (undated) DEVICE — APPLICATOR CHLORAPREP ORN 26ML

## (undated) DEVICE — COVER OVERHEAD SURG LT BLUE

## (undated) DEVICE — TOWEL OR NONABSORB ADH 17X26

## (undated) DEVICE — DRESSING AQUACEL FOAM 5 X 5

## (undated) DEVICE — CORD BIPOLAR 12 FOOT

## (undated) DEVICE — SEE MEDLINE ITEM 157150

## (undated) DEVICE — SPONGE GAUZE 16PLY 4X4

## (undated) DEVICE — SKINMARKER W/RULER DEVON

## (undated) DEVICE — DRESSING MEPILEX BORDER 4 X 4

## (undated) DEVICE — DRESSING SURGICAL 1/2X1/2

## (undated) DEVICE — GLOVE SURGICAL LATEX SZ 6

## (undated) DEVICE — SEE MEDLINE ITEM 146292

## (undated) DEVICE — DRAPE STERI-DRAPE 1000 17X11IN

## (undated) DEVICE — BUR MIS CURVED 2.5MM

## (undated) DEVICE — CLOSURE SKIN STERI STRIP 1/4X4

## (undated) DEVICE — SEE MEDLINE ITEM 157125

## (undated) DEVICE — ALCOHOL 70% ISOP W/GREEN 16OZ

## (undated) DEVICE — ELECTRODE REM PLYHSV RETURN 9

## (undated) DEVICE — SEE MEDLINE ITEM 157148